# Patient Record
Sex: FEMALE | Race: WHITE | NOT HISPANIC OR LATINO | Employment: FULL TIME | URBAN - METROPOLITAN AREA
[De-identification: names, ages, dates, MRNs, and addresses within clinical notes are randomized per-mention and may not be internally consistent; named-entity substitution may affect disease eponyms.]

---

## 2017-04-11 DIAGNOSIS — G47.33 OSA (OBSTRUCTIVE SLEEP APNEA): Primary | ICD-10-CM

## 2017-08-01 ENCOUNTER — TELEPHONE (OUTPATIENT)
Dept: SLEEP MEDICINE | Facility: CLINIC | Age: 52
End: 2017-08-01

## 2017-08-01 DIAGNOSIS — G25.81 RESTLESS LEGS SYNDROME (RLS): ICD-10-CM

## 2017-08-01 NOTE — TELEPHONE ENCOUNTER
Patient called because she would like a refill of her pramipexole and hasn't been seen this year. Please give her a call at [phone].    Pratibha Rm

## 2017-08-07 ENCOUNTER — TELEPHONE (OUTPATIENT)
Dept: SLEEP MEDICINE | Facility: CLINIC | Age: 52
End: 2017-08-07

## 2017-08-07 NOTE — TELEPHONE ENCOUNTER
Patient called last week to get a refill of her Paroxetine 20mg and hadn't heard back from anyone. She is completely out of her medication. She is requesting us the refill her prescription with [facility] and then just send a five day refill over to [facility] on [address] in [place]. She would like someone to call her back to confirm they have sent the prescription over to [facility] and [facility]. You may leave her a voice mail.

## 2017-08-08 RX ORDER — PRAMIPEXOLE DIHYDROCHLORIDE 0.5 MG/1
0.5 TABLET ORAL AT BEDTIME
Qty: 90 TABLET | Refills: 3 | Status: SHIPPED | OUTPATIENT
Start: 2017-08-08 | End: 2018-08-03

## 2017-08-21 NOTE — TELEPHONE ENCOUNTER
Medication was filled on 08/08/2017  Left msg with pt to call if she has any further questions.    Rosa Hanks  [facility] - Specialist

## 2017-09-10 ENCOUNTER — HEALTH MAINTENANCE LETTER (OUTPATIENT)
Age: 52
End: 2017-09-10

## 2018-08-03 DIAGNOSIS — G25.81 RESTLESS LEGS SYNDROME (RLS): ICD-10-CM

## 2018-08-06 RX ORDER — PRAMIPEXOLE DIHYDROCHLORIDE 0.5 MG/1
0.5 TABLET ORAL AT BEDTIME
Qty: 90 TABLET | Refills: 3 | Status: SHIPPED | OUTPATIENT
Start: 2018-08-06

## 2019-08-30 ENCOUNTER — TELEPHONE (OUTPATIENT)
Dept: SLEEP MEDICINE | Facility: CLINIC | Age: 54
End: 2019-08-30

## 2019-08-30 NOTE — TELEPHONE ENCOUNTER
[facility] contacted our office regarding this patient. She is requesting a refill of Pramipexole. Patient has not been seen in clinic since 2016 and last refill was 2018. Refill request for this medication is denied until patient follows up in clinic with one of our providers or her primary care provider.    Rosa VillagomezHanks  [facility] - Specialist

## 2019-11-08 ENCOUNTER — HEALTH MAINTENANCE LETTER (OUTPATIENT)
Age: 54
End: 2019-11-08

## 2020-02-23 ENCOUNTER — HEALTH MAINTENANCE LETTER (OUTPATIENT)
Age: 55
End: 2020-02-23

## 2020-12-06 ENCOUNTER — HEALTH MAINTENANCE LETTER (OUTPATIENT)
Age: 55
End: 2020-12-06

## 2021-02-05 ENCOUNTER — APPOINTMENT (OUTPATIENT)
Dept: CT IMAGING | Facility: CLINIC | Age: 56
End: 2021-02-05
Attending: EMERGENCY MEDICINE
Payer: COMMERCIAL

## 2021-02-05 ENCOUNTER — HOSPITAL ENCOUNTER (EMERGENCY)
Facility: CLINIC | Age: 56
Discharge: HOME | End: 2021-02-05
Attending: EMERGENCY MEDICINE | Admitting: EMERGENCY MEDICINE
Payer: COMMERCIAL

## 2021-02-05 VITALS
DIASTOLIC BLOOD PRESSURE: 86 MMHG | TEMPERATURE: 98 F | HEART RATE: 62 BPM | BODY MASS INDEX: 29.37 KG/M2 | RESPIRATION RATE: 16 BRPM | OXYGEN SATURATION: 98 % | HEIGHT: 64 IN | SYSTOLIC BLOOD PRESSURE: 135 MMHG | WEIGHT: 172 LBS

## 2021-02-05 DIAGNOSIS — G44.209 TENSION HEADACHE: ICD-10-CM

## 2021-02-05 LAB
ANION GAP SERPL CALCULATED.3IONS-SCNC: 6 MMOL/L (ref 3–14)
BASOPHILS # BLD AUTO: 0 10E9/L (ref 0–0.2)
BASOPHILS NFR BLD AUTO: 0.6 %
BUN SERPL-MCNC: 15 MG/DL (ref 7–30)
CALCIUM SERPL-MCNC: 9.4 MG/DL (ref 8.5–10.1)
CHLORIDE SERPL-SCNC: 112 MMOL/L (ref 94–109)
CO2 SERPL-SCNC: 24 MMOL/L (ref 20–32)
CREAT SERPL-MCNC: 0.97 MG/DL (ref 0.52–1.04)
DIFFERENTIAL METHOD BLD: NORMAL
EOSINOPHIL # BLD AUTO: 0 10E9/L (ref 0–0.7)
EOSINOPHIL NFR BLD AUTO: 0.8 %
ERYTHROCYTE [DISTWIDTH] IN BLOOD BY AUTOMATED COUNT: 12.1 % (ref 10–15)
GFR SERPL CREATININE-BSD FRML MDRD: 65 ML/MIN/{1.73_M2}
GLUCOSE SERPL-MCNC: 96 MG/DL (ref 70–99)
HCT VFR BLD AUTO: 38.1 % (ref 35–47)
HGB BLD-MCNC: 13.3 G/DL (ref 11.7–15.7)
IMM GRANULOCYTES # BLD: 0 10E9/L (ref 0–0.4)
IMM GRANULOCYTES NFR BLD: 0.4 %
INTERPRETATION ECG - MUSE: NORMAL
LYMPHOCYTES # BLD AUTO: 2 10E9/L (ref 0.8–5.3)
LYMPHOCYTES NFR BLD AUTO: 39 %
MCH RBC QN AUTO: 30.9 PG (ref 26.5–33)
MCHC RBC AUTO-ENTMCNC: 34.9 G/DL (ref 31.5–36.5)
MCV RBC AUTO: 88 FL (ref 78–100)
MONOCYTES # BLD AUTO: 0.3 10E9/L (ref 0–1.3)
MONOCYTES NFR BLD AUTO: 6.6 %
NEUTROPHILS # BLD AUTO: 2.6 10E9/L (ref 1.6–8.3)
NEUTROPHILS NFR BLD AUTO: 52.6 %
NRBC # BLD AUTO: 0 10*3/UL
NRBC BLD AUTO-RTO: 0 /100
PLATELET # BLD AUTO: 221 10E9/L (ref 150–450)
POTASSIUM SERPL-SCNC: 3.9 MMOL/L (ref 3.4–5.3)
RBC # BLD AUTO: 4.31 10E12/L (ref 3.8–5.2)
SODIUM SERPL-SCNC: 142 MMOL/L (ref 133–144)
TROPONIN I SERPL-MCNC: <0.015 UG/L (ref 0–0.04)
WBC # BLD AUTO: 5 10E9/L (ref 4–11)

## 2021-02-05 PROCEDURE — 80048 BASIC METABOLIC PNL TOTAL CA: CPT | Performed by: EMERGENCY MEDICINE

## 2021-02-05 PROCEDURE — 85025 COMPLETE CBC W/AUTO DIFF WBC: CPT | Performed by: EMERGENCY MEDICINE

## 2021-02-05 PROCEDURE — 70450 CT HEAD/BRAIN W/O DYE: CPT

## 2021-02-05 PROCEDURE — 84484 ASSAY OF TROPONIN QUANT: CPT | Performed by: EMERGENCY MEDICINE

## 2021-02-05 PROCEDURE — 99285 EMERGENCY DEPT VISIT HI MDM: CPT | Mod: 25

## 2021-02-05 PROCEDURE — 93005 ELECTROCARDIOGRAM TRACING: CPT

## 2021-02-05 ASSESSMENT — ENCOUNTER SYMPTOMS
PHOTOPHOBIA: 0
WEAKNESS: 0
DIZZINESS: 1
HEADACHES: 1
LIGHT-HEADEDNESS: 1
NUMBNESS: 0

## 2021-02-05 ASSESSMENT — MIFFLIN-ST. JEOR: SCORE: 1365.19

## 2021-02-05 NOTE — ED TRIAGE NOTES
Pt states that she has been having headaches for the past week. Hx of HTN, had high BP reading at home.

## 2021-02-05 NOTE — ED PROVIDER NOTES
History   Chief Complaint:  Headache    HPI   Nicolle Naidu is a 55 year old female with history of breast cancer, hypertension, and type 2 diabetes mellitus who presents with a headache. The patient states she has had a non radiating headache at the base of her neck for the past week. Last night she felt lightheaded and dizzy so she took her blood pressure last night which was 170/145 and 160/145. This morning she took her blood pressure again and it was 168/130. She notes she has a history of high blood pressure but stopped taking her medication in 2019. She also notes she has had increased stress due to her  being in the ICU for his 4th stroke, as well as having to take care of her mother. Of note, the patient states she lost a total of 90 pounds, but has gained 6 pounds since the start of COVID-19. She denies photophobia, weakness, or numbness.    Review of Systems   Eyes: Negative for photophobia.   Neurological: Positive for dizziness, light-headedness and headaches. Negative for weakness and numbness.   All other systems reviewed and are negative.    Allergies:  The patient has no known allergies.     Medications:  Atenolol  Atorvastatin   Flexeril  Flonase  Microzide  Norco  Metformin  Naproxen  Omeprazole  Paroxetine  Mirapex  Ambien    Past Medical History:    Degenerative disorder of bone  Obstructive sleep apnea  Breast cancer  Obesity  Type 2 diabetes mellitus  Hypertension  Hypercholesterolemia  GERD   Adenoid hypertrophy  Pneumonia    Past Surgical History:    Tonsillectomy  Tubal ligation  Mastectomy  Adenoidectomy  Laparoscopic gastrectomy sleeve  Breast reconstruction     Family History:    Mother - Uterine Cancer    Social History:  Stopped smoking in 2018.  Occasional alcohol use, a couple times a week. No history of alcohol withdrawals.     Physical Exam     Patient Vitals for the past 24 hrs:   BP Temp Temp src Pulse Resp SpO2 Height Weight   02/05/21 1117 127/87 98  F (36.7  C)  "Temporal 81 16 98 % 1.626 m (5' 4\") 78 kg (172 lb)       Physical Exam  GENERAL: well developed, pleasant  HEAD: atraumatic  EYES: pupils reactive, extraocular muscles intact, conjunctivae normal  ENT:  mucus membranes moist  NECK:  trachea midline, normal range of motion  RESPIRATORY: no tachypnea, breath sounds clear to auscultation   CVS: normal S1/S2, no murmurs, intact distal pulses  ABDOMEN: soft, nontender, nondistention  MUSCULOSKELETAL: no deformities  SKIN: warm and dry, no acute rashes or ulceration  NEURO: GCS 15, cranial nerves intact, alert and oriented x3  PSYCH:  Mood/affect normal    Emergency Department Course   ECG  ECG taken at 1144, ECG read at 1150  Normal sinus rhythm. Normal ECG.   Rate 62 bpm. NY interval 172 ms. QRS duration 88 ms. QT/QTc 450/456 ms. P-R-T axes 30 -23 21.     Imaging:  CT Head without Contrast  No intracranial hemorrhage, mass, or definite CT evidence of recent  ischemia.  Reading per radiology.    Laboratory:  CBC: AWNL (WBC 5.0, HGB 13.3, )  BMP: Chloride 112 (H), o/w WNL (Creatinine 0.97)  Troponin (Collected 1138): <0.015     Emergency Department Course:    Reviewed:  I reviewed vitals and past medical history    Assessments:  1125 I obtained history and examined the patient as noted above.   1235 I rechecked the patient and explained findings.     Disposition:  The patient was discharged to home.     Impression & Plan     Medical Decision Making:  Patient presents with new onset headache with significant amount of stress with her  being in the ICU as well as caring for a mother but noted increased blood pressure last night and today.  She has no other symptoms such as fevers chills neck pain or findings on exam terms of stroke or dissection.  Labs and imaging are normal.  Blood pressure here is normal.  Discussed with her likely stress and tension from all that issues that are been going on in her family and to continue to monitor her blood pressure but " likely not a cause and effect in terms of blood pressure and her headaches.    Diagnosis:    ICD-10-CM    1. Tension headache  G44.209      Scribe Disclosure:  I, Liz Mattson, am serving as a scribe at 11:18 AM on 2/5/2021 to document services personally performed by Price Fortune MD based on my observations and the provider's statements to me.      Price Fortune MD  02/05/21 1201

## 2021-07-31 ENCOUNTER — HEALTH MAINTENANCE LETTER (OUTPATIENT)
Age: 56
End: 2021-07-31

## 2021-09-25 ENCOUNTER — HEALTH MAINTENANCE LETTER (OUTPATIENT)
Age: 56
End: 2021-09-25

## 2022-01-15 ENCOUNTER — HEALTH MAINTENANCE LETTER (OUTPATIENT)
Age: 57
End: 2022-01-15

## 2022-01-17 ENCOUNTER — APPOINTMENT (OUTPATIENT)
Dept: CT IMAGING | Facility: CLINIC | Age: 57
End: 2022-01-17
Attending: NURSE PRACTITIONER
Payer: COMMERCIAL

## 2022-01-17 ENCOUNTER — HOSPITAL ENCOUNTER (EMERGENCY)
Facility: CLINIC | Age: 57
Discharge: HOME | End: 2022-01-17
Attending: NURSE PRACTITIONER | Admitting: NURSE PRACTITIONER
Payer: COMMERCIAL

## 2022-01-17 VITALS
TEMPERATURE: 97.8 F | DIASTOLIC BLOOD PRESSURE: 96 MMHG | SYSTOLIC BLOOD PRESSURE: 132 MMHG | HEART RATE: 77 BPM | OXYGEN SATURATION: 99 % | RESPIRATION RATE: 18 BRPM | HEIGHT: 64 IN | BODY MASS INDEX: 29.52 KG/M2

## 2022-01-17 DIAGNOSIS — S16.1XXA STRAIN OF NECK MUSCLE, INITIAL ENCOUNTER: ICD-10-CM
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
## 2022-01-17 PROCEDURE — 72125 CT NECK SPINE W/O DYE: CPT

## 2022-01-17 PROCEDURE — 250N000013 HC RX MED GY IP 250 OP 250 PS 637: Performed by: NURSE PRACTITIONER

## 2022-01-17 PROCEDURE — 99284 EMERGENCY DEPT VISIT MOD MDM: CPT | Mod: 25

## 2022-01-17 RX ORDER — CYCLOBENZAPRINE HCL 10 MG
10 TABLET ORAL 3 TIMES DAILY PRN
Qty: 20 TABLET | Refills: 0 | Status: SHIPPED | OUTPATIENT
Start: 2022-01-17 | End: 2022-01-23

## 2022-01-17 RX ORDER — ACETAMINOPHEN 500 MG
1000 TABLET ORAL ONCE
Status: COMPLETED | OUTPATIENT
Start: 2022-01-17 | End: 2022-01-17

## 2022-01-17 RX ORDER — PREDNISONE 20 MG/1
TABLET ORAL
Qty: 3 TABLET | Refills: 0 | Status: SHIPPED | OUTPATIENT
Start: 2022-01-17

## 2022-01-17 RX ADMIN — ACETAMINOPHEN 1000 MG: 500 TABLET, FILM COATED ORAL at 10:34

## 2022-01-17 ASSESSMENT — ENCOUNTER SYMPTOMS
COUGH: 0
MYALGIAS: 1
NECK PAIN: 1
HEADACHES: 0
BACK PAIN: 1
ARTHRALGIAS: 1

## 2022-01-17 NOTE — ED PROVIDER NOTES
"  History   Chief Complaint:  Fall     HPI   Nicolle Naidu is a 56 year old female with history of hypertension, hyperlipidemia, type II diabetes, and right rotator cuff repair, who presents for evaluation following a fall. Yesterday the patient was walking outside when she slipped on ice and fell landing on her left shoulder. She did not strike her head or lose consciousness in the fall. Approximately 5-6 hours after the fall, she has developed pain in the left shoulder, her left upper back, and in the left side of her neck. This pain is worse with movement and radiates down her left arm. Overnight her pain worsened and she had difficulty sleeping due to the severity of her pain. Due to her ongoing pain this morning she came into the ED for evaluation.     Review of Systems   Respiratory: Negative for cough.    Cardiovascular: Negative for chest pain.   Musculoskeletal: Positive for arthralgias (left shoulder), back pain, myalgias (left arm) and neck pain.   Neurological: Negative for syncope and headaches.   All other systems reviewed and are negative.    Allergies:  No known drug allergies     Medications:  Atenolol  Atorvastatin calcium  Flexeril  Microzide  Norco  Metformin  Omeprazole  Paroxetine  Mirapex  Ambien     Past Medical History:     Diabetes mellitus, type II   Hypertension  Hyperlipidemia  GERD   Obstructive sleep apnea   Breast cancer       Past Surgical History:    Breast surgery  Mastectomy  Laparoscopic gastric sleeve  Tonsillectomy     Social History:  The patient presents to the ED alone.     Physical Exam     Patient Vitals for the past 24 hrs:   BP Temp Temp src Pulse Resp SpO2 Height   01/17/22 0750 (!) 140/86 97.8  F (36.6  C) Temporal 91 18 99 % 1.626 m (5' 4\")       Physical Exam  General: Alert. Well kept.  HEENT:   Head: No facial asymmetry. No palpable scalp hematomas or bony step offs.  Eyes: Normal conjunctiva. No scleral icterus. PERRLA. EOMI. No raccoon s eyes. .   Nose: No " deformity. No nasal drainage.   Throat: Moist mucous membranes. No evidence for intraoral trauma.   Neck: left sided C4-5 tenderness  Cardiac: Normal rate and regular rhythm.  No murmurs, rubs, or gallops appreciated.   Pulmonary: CTA bilaterally. No wheezing, crackles, or rhonchi appreciated.   Abdomen: Soft, non-tender, non-distended. No rebound or guarding.   Neuro: GCS 15. Alert and oriented. Cranial nerves II-XII intact. 5/5 strength equal bilateral upper and lower extremities. Gait smooth.  Visual fields bilateral without deficit.  MUSCULOSKELETAL: Normal gross range of motion of all 4 extremities. No midline tenderness over thoracic, lumbar or sacral spine.   Upper extremities: 5/5 symmetric strength and ROM with shoulder abduction and adduction, elbow flexion and extension, dorsi- and palmar-flexion, , compartments soft.   SKIN: Skin is warm and dry. No rashes, petechiae or pallor. Normal appearance of visualized exposed skin.   PSYCH: Normal affect and mood. Good eye contact.    Emergency Department Course     Imaging:  Cervical Spine CT w/o Contrast:  IMPRESSION:   1. No acute fracture or posttraumatic malalignment of the cervical spine.   2. Multilevel degenerative changes, as described.   Per radiology.      Emergency Department Course:  Reviewed:  I reviewed nursing notes, vitals and past medical history    Assessments:  1022: I obtained history and examined the patient as noted above.     1140: I updated and reassessed the patient.     Interventions:  1034 Tylenol 1,000 mg PO    Disposition:  The patient was discharged to home.     Impression & Plan   Medical Decision Making:  Nicolle Naidu is a 56 year old female with history of hypertension, hyperlipidemia, type II diabetes, and right rotator cuff repair, who presents for evaluation following a fall last evening with a left medial scapular and left-sided neck pain.  The patient is concerned for her radiating left arm pain.  She has taken no  medication for symptoms today.  On exam she has mild tenderness along the left cervical spine at C4/5 with normal full strength and sensation in the left upper extremity.  She has normal range of motion at the shoulder, elbow, wrist.  CT of the cervical spine shows degenerative changes with minimal disc height loss at C5/6 and a small multilevel disc bulges/protrusions with mild multilevel spinal canal stenosis and no high-grade spinal canal narrowing.  She did not strike her head and has a normal neurologic exam without headache and no indication for head CT using [place] head CT guidelines.  No other injuries noted on examination.  No indication for MRI or neurosurgical evaluation from the ED.  She will be started on prednisone and Flexeril for her injury and will follow-up with her primary care and/or her [facility] orthopedist in the next 2 to 3 days.  She will return for any headache, weakness of the left upper extremity, new injury.       Diagnosis:    ICD-10-CM    1. Fall, initial encounter  W19.XXXA    2. Strain of neck muscle, initial encounter  S16.1XXA       Discharge Medications:  New Prescriptions    CYCLOBENZAPRINE (FLEXERIL) 10 MG TABLET    Take 1 tablet (10 mg) by mouth 3 times daily as needed for muscle spasms    PREDNISONE (DELTASONE) 20 MG TABLET    Take one tablets (= 20mg) each day for 3 (three) days        Scribe Disclosure:  I, Cirilo Rojas, am serving as a scribe at 10:22 AM on 1/17/2022 to document services personally performed by Shavon Murillo DNP, based on my observations and the provider's statements to me.        Shavon Murillo, CNP  01/17/22 1096

## 2022-01-17 NOTE — ED TRIAGE NOTES
Pt reports slipping on  the ice yestersay and falling on her left shoulder and arm.  Complains of worsening arm, shoulder, and neck pain today that is worse with movement.

## 2022-02-12 ENCOUNTER — HOSPITAL ENCOUNTER (EMERGENCY)
Facility: CLINIC | Age: 57
Discharge: HOME | End: 2022-02-12
Attending: EMERGENCY MEDICINE | Admitting: EMERGENCY MEDICINE
Payer: COMMERCIAL

## 2022-02-12 ENCOUNTER — APPOINTMENT (OUTPATIENT)
Dept: CT IMAGING | Facility: CLINIC | Age: 57
End: 2022-02-12
Attending: EMERGENCY MEDICINE
Payer: COMMERCIAL

## 2022-02-12 VITALS
RESPIRATION RATE: 16 BRPM | BODY MASS INDEX: 29.02 KG/M2 | TEMPERATURE: 98.1 F | OXYGEN SATURATION: 97 % | HEART RATE: 69 BPM | WEIGHT: 170 LBS | SYSTOLIC BLOOD PRESSURE: 147 MMHG | DIASTOLIC BLOOD PRESSURE: 98 MMHG | HEIGHT: 64 IN

## 2022-02-12 DIAGNOSIS — R06.00 DYSPNEA, UNSPECIFIED TYPE: ICD-10-CM

## 2022-02-12 DIAGNOSIS — U07.1 INFECTION DUE TO 2019 NOVEL CORONAVIRUS: ICD-10-CM

## 2022-02-12 DIAGNOSIS — I77.810 AORTIC ECTASIA, THORACIC (H): ICD-10-CM

## 2022-02-12 LAB
ATRIAL RATE - MUSE: 76 BPM
DIASTOLIC BLOOD PRESSURE - MUSE: NORMAL MMHG
INTERPRETATION ECG - MUSE: NORMAL
P AXIS - MUSE: 31 DEGREES
PR INTERVAL - MUSE: 162 MS
QRS DURATION - MUSE: 86 MS
QT - MUSE: 374 MS
QTC - MUSE: 420 MS
R AXIS - MUSE: -23 DEGREES
SYSTOLIC BLOOD PRESSURE - MUSE: NORMAL MMHG
T AXIS - MUSE: 36 DEGREES
TROPONIN I SERPL HS-MCNC: <3 NG/L
VENTRICULAR RATE- MUSE: 76 BPM

## 2022-02-12 PROCEDURE — 93005 ELECTROCARDIOGRAM TRACING: CPT

## 2022-02-12 PROCEDURE — 84484 ASSAY OF TROPONIN QUANT: CPT | Performed by: EMERGENCY MEDICINE

## 2022-02-12 PROCEDURE — 250N000011 HC RX IP 250 OP 636: Performed by: EMERGENCY MEDICINE

## 2022-02-12 PROCEDURE — 71275 CT ANGIOGRAPHY CHEST: CPT

## 2022-02-12 PROCEDURE — 99285 EMERGENCY DEPT VISIT HI MDM: CPT | Mod: 25

## 2022-02-12 PROCEDURE — 36415 COLL VENOUS BLD VENIPUNCTURE: CPT | Performed by: EMERGENCY MEDICINE

## 2022-02-12 PROCEDURE — 250N000009 HC RX 250: Performed by: EMERGENCY MEDICINE

## 2022-02-12 RX ORDER — IOPAMIDOL 755 MG/ML
65 INJECTION, SOLUTION INTRAVASCULAR ONCE
Status: COMPLETED | OUTPATIENT
Start: 2022-02-12 | End: 2022-02-12

## 2022-02-12 RX ADMIN — IOPAMIDOL 65 ML: 755 INJECTION, SOLUTION INTRAVENOUS at 17:32

## 2022-02-12 RX ADMIN — SODIUM CHLORIDE 90 ML: 9 INJECTION, SOLUTION INTRAVENOUS at 17:32

## 2022-02-12 ASSESSMENT — ENCOUNTER SYMPTOMS
DIZZINESS: 1
CONFUSION: 1
COUGH: 1
SHORTNESS OF BREATH: 0
FATIGUE: 1

## 2022-02-12 ASSESSMENT — MIFFLIN-ST. JEOR: SCORE: 1351.11

## 2022-02-12 NOTE — ED PROVIDER NOTES
History   Chief Complaint:  Chest Pain     The history is provided by the patient.      Nicolle Naidu is a 56 year old female with history of hypertension, hyperlipidemia and type 2 diabetes who presents with chest pain. Patient reports that she was diagnosed with Covid on 01/28/22 and has been experiencing residual symptoms for about a week. States that her most prominent symptoms is intermittent chest pressure when she lies down. Endorses she also feels fatigue when going up the stairs, which is out of her baseline. Denies shortness of breath or new ankle swelling. Other symptoms include dizziness, cough and confusion. Notes she was seen yesterday for her concerns and had blood work done (see results below), which showed an elevated D-dimer. Her PCP advised her to come into the ED for a chest CT. Vaccinated x2 to Covid with Pfizer. Nonsmoker, no recent travel or sick contacts.     Laboratory 02/11/22  Ddimer: 1.21 (H)  CBC: WBC 5.2, HGB 12.3,   CMP: Glucose 86, o/w WNL (Creatinine 0.92)    Review of Systems   Constitutional: Positive for fatigue.   Respiratory: Positive for cough. Negative for shortness of breath.    Cardiovascular: Positive for chest pain. Negative for leg swelling.   Neurological: Positive for dizziness.   Psychiatric/Behavioral: Positive for confusion.     Allergies:  Bee Venom    Medications:  Albuterol inhaler  Aspirin 81 mg   Tessalon  Lasix  Robitussin AC  Topamax  Trazodone  Flexeril  Prilosec  Paxil  Prednisone    Past Medical History:     Breast cancer  Obese  Type 2 diabetes  Hyperlipidemia  Hypertension  GERD  Tobacco use    Past Surgical History:    Gastric sleeve  Mastectomy  Tonsillectomy  Tubal ligation  Hysterectomy    Family History:    Father - hypertension, type 2 diabetes  Mother - COPD, hypertension, endometrial cancer    Social History:  Patient presents alone  Presents via private vehicle  Nonsmoker    Physical Exam     Patient Vitals for the past 24 hrs:   BP  "Temp Temp src Pulse Resp SpO2 Height Weight   02/12/22 1936 (!) 147/98 98.1  F (36.7  C) Oral 69 16 97 % -- --   02/12/22 1934 (!) 147/98 -- -- 69 -- -- -- --   02/12/22 1731 -- -- -- 66 17 97 % -- --   02/12/22 1632 125/85 98.1  F (36.7  C) Oral 82 -- 95 % -- --   02/12/22 1630 -- -- -- -- 16 -- 1.626 m (5' 4\") 77.1 kg (170 lb)       Physical Exam    Physical Exam   Constitutional:  Patient is oriented to person, place, and time. They appear well-developed and well-nourished.    HENT:   Mouth/Throat:   Oropharynx is clear and moist.   Eyes:    Conjunctivae normal and EOM are normal. Pupils are equal, round, and reactive to light.   Neck:    Normal range of motion.   Cardiovascular: Normal rate, regular rhythm and normal heart sounds.  Exam reveals no gallop and no friction rub.  No murmur heard.  Pulmonary/Chest:  Effort normal and breath sounds normal. Patient has no rales. Trace expiratory wheezes.   Abdominal:   Soft. Bowel sounds are normal. Patient exhibits no mass. There is no tenderness. There is no rebound and no guarding.   Musculoskeletal:  Normal range of motion. Patient exhibits no edema.   Neurological:   Patient is alert and oriented to person, place, and time. Patient has normal strength. No cranial nerve deficit or sensory deficit. GCS 15.  Skin:   Skin is warm and dry. No rash noted. No erythema.   Psychiatric:   Patient has a normal mood and affect. Patient's behavior is normal. Judgment and thought content normal.     Emergency Department Course   ECG  ECG obtained at 1641, ECG read at 1650  NSR  Minimal voltage criteria for LVH, may be normal variant   Rate 76 bpm. ID interval 162 ms. QRS duration 86 ms. QT/QTc 374/420 ms. P-R-T axes 31 -23 36.     Imaging:  CT Chest Pulmonary Embolism w Contrast   Final Result   IMPRESSION:   1.  No evidence for pulmonary embolism.   2.  Ectasia of the ascending thoracic aorta measures 3.8 cm.        Report per radiology.    Laboratory:  Labs Ordered and " Resulted from Time of ED Arrival to Time of ED Departure   TROPONIN I - Normal       Result Value    Troponin I High Sensitivity <3         Procedures    Emergency Department Course:         Reviewed:  I reviewed nursing notes, vitals, past medical history, Care Everywhere and [facility]    Assessments/Consults:  Past medical records, nursing notes, and vitals reviewed.  1653: I performed an exam of the patient and obtained history, as documented above.   1850: Rechecked and updated the patient.    Interventions:    Disposition:  The patient was discharged to home.     Impression & Plan   Medical Decision Making:  Nicolle Naidu is a 56-year-old female presenting to the emergency department at the recommendation of her primary care doctor after they did a D-dimer yesterday office and it came back elevated today.  Again she went to go see her doctor as she was having lingering symptoms since her diagnosis of COVID.  I did do an EKG here this is a sinus rhythm without any acute abnormalities.  I do see the blood work from yesterday that shows no evidence of leukocytosis or acute anemia.  Metabolic panel is also normal.  I did do a troponin here today and it was within normal limits.  I did do a CTA of her chest to evaluate for PE.  There is no PE or mass, free air, pleural effusion, pericardial effusion, dissection.  She does have aortic ectasia which is an incidental finding and I did discuss with her.    At this time I suspect her symptoms are due to lingering Covid.  I feel she is safe for discharge there is no acute medical reason why she requires hospitalization.  She will take her inhaler as prescribed by her primary care doctor yesterday and follow-up closely with primary.    Diagnosis:    ICD-10-CM    1. Dyspnea, unspecified type  R06.00    2. Infection due to 2019 novel coronavirus  U07.1    3. Aortic ectasia, thoracic (H)  I77.810        Discharge Medications:  Discharge Medication List as of 2/12/2022  7:38 PM           Scribe Disclosure:  I, Kusum Lynn, am serving as a scribe at 4:34 PM on 2/12/2022 to document services personally performed by Mellissa Lopez MD based on my observations and the provider's statements to me.             Mellissa Lopez MD  02/12/22 2051

## 2022-02-12 NOTE — ED TRIAGE NOTES
Patient was sent here for CT due to elevated D Dimer. She was Dx 1/28 with covid. She is short of breath with a cough and feeling fatigued and having chest pain

## 2022-02-13 ENCOUNTER — MEDICAL CORRESPONDENCE (OUTPATIENT)
Dept: HEALTH INFORMATION MANAGEMENT | Facility: CLINIC | Age: 57
End: 2022-02-13
Payer: COMMERCIAL

## 2022-03-12 ENCOUNTER — HEALTH MAINTENANCE LETTER (OUTPATIENT)
Age: 57
End: 2022-03-12

## 2022-07-02 ENCOUNTER — OFFICE VISIT (OUTPATIENT)
Dept: URGENT CARE | Facility: URGENT CARE | Age: 57
End: 2022-07-02
Payer: COMMERCIAL

## 2022-07-02 ENCOUNTER — ANCILLARY PROCEDURE (OUTPATIENT)
Dept: GENERAL RADIOLOGY | Facility: CLINIC | Age: 57
End: 2022-07-02
Attending: PHYSICIAN ASSISTANT
Payer: COMMERCIAL

## 2022-07-02 VITALS
DIASTOLIC BLOOD PRESSURE: 99 MMHG | OXYGEN SATURATION: 99 % | HEART RATE: 74 BPM | TEMPERATURE: 97.4 F | SYSTOLIC BLOOD PRESSURE: 146 MMHG

## 2022-07-02 DIAGNOSIS — S69.92XA FINGER INJURY, LEFT, INITIAL ENCOUNTER: ICD-10-CM

## 2022-07-02 DIAGNOSIS — S62.667A CLOSED NONDISPLACED FRACTURE OF DISTAL PHALANX OF LEFT LITTLE FINGER, INITIAL ENCOUNTER: Primary | ICD-10-CM

## 2022-07-02 PROCEDURE — 73140 X-RAY EXAM OF FINGER(S): CPT | Mod: TC | Performed by: RADIOLOGY

## 2022-07-02 PROCEDURE — 99203 OFFICE O/P NEW LOW 30 MIN: CPT | Performed by: PHYSICIAN ASSISTANT

## 2022-07-02 RX ORDER — FUROSEMIDE 20 MG
TABLET ORAL
COMMUNITY
Start: 2022-04-03

## 2022-07-02 RX ORDER — TRAZODONE HYDROCHLORIDE 50 MG/1
100 TABLET, FILM COATED ORAL AT BEDTIME
COMMUNITY
Start: 2022-06-18 | End: 2022-11-05 | Stop reason: ALTCHOICE

## 2022-07-02 RX ORDER — IBUPROFEN 200 MG
950 CAPSULE ORAL
COMMUNITY

## 2022-07-02 NOTE — PATIENT INSTRUCTIONS
(S07.044H) Closed nondisplaced fracture of distal phalanx of left little finger, initial encounter  (primary encounter diagnosis)  Comment:   Plan: acetaminophen-codeine (TYLENOL #3) 300-30 MG         Tablet  Alumafoam splint placed.              (E93.71EJ) Finger injury, left, initial encounter  Comment:   Plan: XR Finger Left G/E 2 Views          Follow up with Orthopedics as soon as possible.

## 2022-07-02 NOTE — PROGRESS NOTES
Patient presents with:  Urgent Care: Fell x 1 week left hand pinky    (U33.360B) Closed nondisplaced fracture of distal phalanx of left little finger, initial encounter  (primary encounter diagnosis)  Comment:   Plan: acetaminophen-codeine (TYLENOL #3) 300-30 MG         Tablet  Alumafoam splint placed.              (S69.92XA) Finger injury, left, initial encounter  Comment:   Plan: XR Finger Left G/E 2 Views          Follow up with Orthopedics as soon as possible.        SUBJECTIVE:   Nicolle Naidu is a 57 year old female who presents today with left pinky pain and swelling, onset one week ago after fall.            Past Medical History:   Diagnosis Date     Degenerative disorder of bone 2010    back; chronic         Current Outpatient Medications   Medication Sig Dispense Refill     Multiple Vitamins-Iron (DAILY-ANA/IRON/BETA-CAROTENE) TABS TAKE 1 TABLET BY MOUTH DAILY. (Patient not taking: Reported on 10/19/2020) 30 tablet 7     Social History     Tobacco Use     Smoking status: Never Smoker     Smokeless tobacco: Never Used   Substance Use Topics     Alcohol use: Not on file     Family History   Problem Relation Age of Onset     Diabetes Mother      Diabetes Father          ROS:    10 point ROS of systems including Constitutional, Eyes, Respiratory, Cardiovascular, Gastroenterology, Genitourinary, Integumentary, Muscularskeletal, Psychiatric ,neurological were all negative except for pertinent positives noted in my HPI       OBJECTIVE:  BP (!) 146/99 (BP Location: Right arm, Patient Position: Sitting, Cuff Size: Adult Regular)   Pulse 74   Temp 97.4  F (36.3  C) (Tympanic)   SpO2 99%   Breastfeeding No   Physical Exam:  GENERAL APPEARANCE: healthy, alert and no distress  Extremities: left 5th finger with tenderness and swelling  NEURO: Normal strength and tone, sensory exam grossly normal,  normal speech and mentation  SKIN: no suspicious lesions or rashes    X-Ray was done, my findings are: avulsion  fracture. displaced

## 2022-11-03 ENCOUNTER — APPOINTMENT (OUTPATIENT)
Dept: GENERAL RADIOLOGY | Facility: CLINIC | Age: 57
End: 2022-11-03
Attending: EMERGENCY MEDICINE
Payer: COMMERCIAL

## 2022-11-03 ENCOUNTER — HOSPITAL ENCOUNTER (OUTPATIENT)
Facility: CLINIC | Age: 57
Setting detail: OBSERVATION
Discharge: HOME | End: 2022-11-05
Attending: EMERGENCY MEDICINE | Admitting: EMERGENCY MEDICINE
Payer: COMMERCIAL

## 2022-11-03 DIAGNOSIS — R45.851 SUICIDAL IDEATION: ICD-10-CM

## 2022-11-03 DIAGNOSIS — F32.A DEPRESSION, UNSPECIFIED DEPRESSION TYPE: ICD-10-CM

## 2022-11-03 DIAGNOSIS — F10.20 ALCOHOL USE DISORDER, SEVERE, DEPENDENCE (H): ICD-10-CM

## 2022-11-03 LAB
ALBUMIN SERPL-MCNC: 3.9 G/DL (ref 3.4–5)
ALP SERPL-CCNC: 81 U/L (ref 40–150)
ALT SERPL W P-5'-P-CCNC: 36 U/L (ref 0–50)
ANION GAP SERPL CALCULATED.3IONS-SCNC: 7 MMOL/L (ref 3–14)
AST SERPL W P-5'-P-CCNC: 37 U/L (ref 0–45)
BASOPHILS # BLD AUTO: 0 10E3/UL (ref 0–0.2)
BASOPHILS NFR BLD AUTO: 0 %
BILIRUB SERPL-MCNC: 1.2 MG/DL (ref 0.2–1.3)
BUN SERPL-MCNC: 11 MG/DL (ref 7–30)
CALCIUM SERPL-MCNC: 9.9 MG/DL (ref 8.5–10.1)
CHLORIDE BLD-SCNC: 103 MMOL/L (ref 94–109)
CO2 SERPL-SCNC: 26 MMOL/L (ref 20–32)
CREAT SERPL-MCNC: 0.77 MG/DL (ref 0.52–1.04)
EOSINOPHIL # BLD AUTO: 0 10E3/UL (ref 0–0.7)
EOSINOPHIL NFR BLD AUTO: 0 %
ERYTHROCYTE [DISTWIDTH] IN BLOOD BY AUTOMATED COUNT: 12.4 % (ref 10–15)
ETHANOL SERPL-MCNC: <0.01 G/DL
GFR SERPL CREATININE-BSD FRML MDRD: 90 ML/MIN/1.73M2
GLUCOSE BLD-MCNC: 97 MG/DL (ref 70–99)
HCT VFR BLD AUTO: 40.7 % (ref 35–47)
HGB BLD-MCNC: 14.1 G/DL (ref 11.7–15.7)
IMM GRANULOCYTES # BLD: 0 10E3/UL
IMM GRANULOCYTES NFR BLD: 0 %
LYMPHOCYTES # BLD AUTO: 1.9 10E3/UL (ref 0.8–5.3)
LYMPHOCYTES NFR BLD AUTO: 32 %
MCH RBC QN AUTO: 32.5 PG (ref 26.5–33)
MCHC RBC AUTO-ENTMCNC: 34.6 G/DL (ref 31.5–36.5)
MCV RBC AUTO: 94 FL (ref 78–100)
MONOCYTES # BLD AUTO: 0.4 10E3/UL (ref 0–1.3)
MONOCYTES NFR BLD AUTO: 6 %
NEUTROPHILS # BLD AUTO: 3.6 10E3/UL (ref 1.6–8.3)
NEUTROPHILS NFR BLD AUTO: 62 %
NRBC # BLD AUTO: 0 10E3/UL
NRBC BLD AUTO-RTO: 0 /100
PLATELET # BLD AUTO: 197 10E3/UL (ref 150–450)
POTASSIUM BLD-SCNC: 4 MMOL/L (ref 3.4–5.3)
PROT SERPL-MCNC: 7.6 G/DL (ref 6.8–8.8)
RBC # BLD AUTO: 4.34 10E6/UL (ref 3.8–5.2)
SARS-COV-2 RNA RESP QL NAA+PROBE: NEGATIVE
SODIUM SERPL-SCNC: 136 MMOL/L (ref 133–144)
WBC # BLD AUTO: 5.9 10E3/UL (ref 4–11)

## 2022-11-03 PROCEDURE — 71046 X-RAY EXAM CHEST 2 VIEWS: CPT

## 2022-11-03 PROCEDURE — 250N000013 HC RX MED GY IP 250 OP 250 PS 637: Performed by: PSYCHIATRY & NEUROLOGY

## 2022-11-03 PROCEDURE — 82077 ASSAY SPEC XCP UR&BREATH IA: CPT | Performed by: EMERGENCY MEDICINE

## 2022-11-03 PROCEDURE — 99220 PR INITIAL OBSERVATION CARE,LEVEL III: CPT | Performed by: PSYCHIATRY & NEUROLOGY

## 2022-11-03 PROCEDURE — 85004 AUTOMATED DIFF WBC COUNT: CPT | Performed by: EMERGENCY MEDICINE

## 2022-11-03 PROCEDURE — C9803 HOPD COVID-19 SPEC COLLECT: HCPCS

## 2022-11-03 PROCEDURE — G0378 HOSPITAL OBSERVATION PER HR: HCPCS

## 2022-11-03 PROCEDURE — 80053 COMPREHEN METABOLIC PANEL: CPT | Performed by: EMERGENCY MEDICINE

## 2022-11-03 PROCEDURE — 90791 PSYCH DIAGNOSTIC EVALUATION: CPT

## 2022-11-03 PROCEDURE — 36415 COLL VENOUS BLD VENIPUNCTURE: CPT | Performed by: EMERGENCY MEDICINE

## 2022-11-03 PROCEDURE — 250N000013 HC RX MED GY IP 250 OP 250 PS 637: Performed by: EMERGENCY MEDICINE

## 2022-11-03 PROCEDURE — 99285 EMERGENCY DEPT VISIT HI MDM: CPT | Mod: 25

## 2022-11-03 PROCEDURE — U0003 INFECTIOUS AGENT DETECTION BY NUCLEIC ACID (DNA OR RNA); SEVERE ACUTE RESPIRATORY SYNDROME CORONAVIRUS 2 (SARS-COV-2) (CORONAVIRUS DISEASE [COVID-19]), AMPLIFIED PROBE TECHNIQUE, MAKING USE OF HIGH THROUGHPUT TECHNOLOGIES AS DESCRIBED BY CMS-2020-01-R: HCPCS | Performed by: EMERGENCY MEDICINE

## 2022-11-03 RX ORDER — CHOLECALCIFEROL (VITAMIN D3) 50 MCG
2000 TABLET ORAL DAILY
Status: DISCONTINUED | OUTPATIENT
Start: 2022-11-04 | End: 2022-11-05 | Stop reason: HOSPADM

## 2022-11-03 RX ORDER — GABAPENTIN 600 MG/1
600 TABLET ORAL 2 TIMES DAILY
Status: DISCONTINUED | OUTPATIENT
Start: 2022-11-03 | End: 2022-11-05 | Stop reason: HOSPADM

## 2022-11-03 RX ORDER — CLONIDINE HYDROCHLORIDE 0.1 MG/1
0.1 TABLET ORAL 3 TIMES DAILY PRN
Status: DISCONTINUED | OUTPATIENT
Start: 2022-11-03 | End: 2022-11-03

## 2022-11-03 RX ORDER — CLONIDINE HYDROCHLORIDE 0.1 MG/1
0.1 TABLET ORAL 2 TIMES DAILY
Status: DISCONTINUED | OUTPATIENT
Start: 2022-11-03 | End: 2022-11-05 | Stop reason: HOSPADM

## 2022-11-03 RX ORDER — DIAZEPAM 10 MG/2ML
5-10 INJECTION, SOLUTION INTRAMUSCULAR; INTRAVENOUS EVERY 30 MIN PRN
Status: DISCONTINUED | OUTPATIENT
Start: 2022-11-03 | End: 2022-11-05 | Stop reason: HOSPADM

## 2022-11-03 RX ORDER — IBUPROFEN 200 MG
950 CAPSULE ORAL DAILY
Status: DISCONTINUED | OUTPATIENT
Start: 2022-11-04 | End: 2022-11-05 | Stop reason: HOSPADM

## 2022-11-03 RX ORDER — DIAZEPAM 5 MG
10 TABLET ORAL EVERY 30 MIN PRN
Status: DISCONTINUED | OUTPATIENT
Start: 2022-11-03 | End: 2022-11-05 | Stop reason: HOSPADM

## 2022-11-03 RX ORDER — FUROSEMIDE 20 MG
20 TABLET ORAL DAILY
Status: DISCONTINUED | OUTPATIENT
Start: 2022-11-03 | End: 2022-11-05 | Stop reason: HOSPADM

## 2022-11-03 RX ORDER — ACETAMINOPHEN 500 MG
500 TABLET ORAL EVERY 6 HOURS PRN
Status: DISCONTINUED | OUTPATIENT
Start: 2022-11-03 | End: 2022-11-05 | Stop reason: HOSPADM

## 2022-11-03 RX ORDER — LISINOPRIL 10 MG/1
10 TABLET ORAL DAILY
COMMUNITY

## 2022-11-03 RX ORDER — LISINOPRIL 10 MG/1
10 TABLET ORAL DAILY
Status: DISCONTINUED | OUTPATIENT
Start: 2022-11-03 | End: 2022-11-05 | Stop reason: HOSPADM

## 2022-11-03 RX ORDER — MULTIPLE VITAMINS W/ MINERALS TAB 9MG-400MCG
1 TAB ORAL DAILY
Status: DISCONTINUED | OUTPATIENT
Start: 2022-11-03 | End: 2022-11-05 | Stop reason: HOSPADM

## 2022-11-03 RX ORDER — PAROXETINE 20 MG/1
20 TABLET, FILM COATED ORAL DAILY
Status: DISCONTINUED | OUTPATIENT
Start: 2022-11-03 | End: 2022-11-05 | Stop reason: HOSPADM

## 2022-11-03 RX ORDER — LORAZEPAM 1 MG/1
1 TABLET ORAL
Status: COMPLETED | OUTPATIENT
Start: 2022-11-03 | End: 2022-11-03

## 2022-11-03 RX ORDER — TRAZODONE HYDROCHLORIDE 50 MG/1
50 TABLET, FILM COATED ORAL AT BEDTIME
Status: DISCONTINUED | OUTPATIENT
Start: 2022-11-03 | End: 2022-11-04

## 2022-11-03 RX ORDER — QUETIAPINE FUMARATE 25 MG/1
25 TABLET, FILM COATED ORAL
Status: DISCONTINUED | OUTPATIENT
Start: 2022-11-03 | End: 2022-11-05 | Stop reason: HOSPADM

## 2022-11-03 RX ADMIN — GABAPENTIN 600 MG: 600 TABLET, FILM COATED ORAL at 21:28

## 2022-11-03 RX ADMIN — NICOTINE POLACRILEX 2 MG: 2 GUM, CHEWING ORAL at 14:12

## 2022-11-03 RX ADMIN — LORAZEPAM 1 MG: 1 TABLET ORAL at 11:25

## 2022-11-03 RX ADMIN — ACETAMINOPHEN 500 MG: 500 TABLET ORAL at 14:12

## 2022-11-03 RX ADMIN — ACETAMINOPHEN 500 MG: 500 TABLET ORAL at 19:52

## 2022-11-03 RX ADMIN — MULTIPLE VITAMINS W/ MINERALS TAB 1 TABLET: TAB at 14:12

## 2022-11-03 RX ADMIN — LISINOPRIL 10 MG: 10 TABLET ORAL at 19:46

## 2022-11-03 RX ADMIN — BENZOCAINE 6 MG-MENTHOL 10 MG LOZENGES 1 LOZENGE: at 22:12

## 2022-11-03 RX ADMIN — TRAZODONE HYDROCHLORIDE 50 MG: 50 TABLET ORAL at 21:28

## 2022-11-03 RX ADMIN — FUROSEMIDE 20 MG: 20 TABLET ORAL at 19:46

## 2022-11-03 RX ADMIN — PAROXETINE HYDROCHLORIDE 20 MG: 20 TABLET, FILM COATED ORAL at 19:46

## 2022-11-03 RX ADMIN — CLONIDINE HYDROCHLORIDE 0.1 MG: 0.1 TABLET ORAL at 21:28

## 2022-11-03 ASSESSMENT — ACTIVITIES OF DAILY LIVING (ADL)
ADLS_ACUITY_SCORE: 35
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
## 2022-11-03 NOTE — ED PROVIDER NOTES
Visit Date: 11/03/2022    CHIEF COMPLAINT:  Fall.    HISTORY OF PRESENT ILLNESS:  This is a 57-year-old woman who presents to the Emergency Department with complaints of right rib pain.  She states she had been drinking and fell and may have hurt her ribs.  She has been having a lot of stress at home.  She has a  who is as medical problems and she has been using alcohol to deal with this.  She has been drinking on a regular basis for some time.  She has also been feeling depressed and thought about not wanting to live anymore and even thought about going in her car and doing something there.  She does not have a therapist or psychiatrist she sees, although she does take Ambien to help her sleep and looks like trazodone at times as well.  She has never gone through alcohol withdrawal or had the DTs.    PAST MEDICAL HISTORY:  Includes breast cancer, diabetes, hypertension, reflux disease.  She has had a gastric sleeve surgery.    MEDICATIONS:  As previously noted, also includes:  1.  Lasix.  2.  Zestril.  3.  Omeprazole.    FAMILY AND SOCIAL HISTORY:  As noted above.  She has also started smoking.    REVIEW OF SYSTEMS:  As noted with all other systems negative.    PHYSICAL EXAMINATION:    GENERAL:  Reveals a middle-aged female, in no respiratory distress, although she is very anxious.  VITAL SIGNS:  Initial blood pressure 122/117, temperature 98, pulse 80, respirations 18, pulse ox 96% on room air.  HEENT:  Pupils equal, reactive.  Extraocular movements intact.  Nares and oropharynx are clear.  NECK:  The neck veins are flat.  LUNGS:  Clear.  HEART:  Heart sounds are regular, without murmur, rubs, or gallops.  ABDOMEN:  Soft, no tenderness or masses.  MUSCULOSKELETAL:  No swelling.  The patient burst into tears stating she does not feel she wants to live anymore. Right anterior chest wall reveals tenderness to palpation, but there is no bruising, no fluctuance, and no crepitance.   SKIN:  No rash.  NEUROLOGIC:   Awake, alert, appropriate.  Fluent speech.  Normal motor and sensation.  PSYCHIATRIC:  The patient denies any visual or auditory hallucinations or any homicidal thought.    COURSE:  Labs were obtained.  Chemistries normal.  White count 59, hemoglobin 14, platelet 197.  Alcohol is 0.  COVID negative and chest x-ray shows no obvious fractures or suspicious abnormalities.  The patient was given 1 mg of Ativan and she is being transferred over to the EMPATH unit for further mental health evaluation.    IMPRESSION:     1.  Alcohol abuse.  2.  Depression with suicidal thoughts.  3.  Chest wall contusion.    Jayant Carrillo MD        D: 2022   T: 2022   MT: [id number]    Name:     ALLISON REYES  MRN:      [id number]        Account:    [id number]   :      1965           Visit Date: 2022     Document: [id number]

## 2022-11-03 NOTE — ED NOTES
Patient starts drinking every day after 15:00, if she does not drink she has the shakes.  I gave her Lorazepam 1 mg price going to [facility].
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
## 2022-11-03 NOTE — ED NOTES
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  She is depressed, anxious, hopeless and helpless.    Reports suicidal thoughts, with no plan.
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
## 2022-11-03 NOTE — ED NOTES
Pt resting in her chair through ost of the shift. Pt is blunt and flat. Pt is more hopeful and glad that she is kenia to stay and get a CD assessment. Pt is a bit more hopeful. Pt scored a 3 on CIWA. Pt has rib pain 5/10 stated it was better after Tylenol . Nursing to continue to monitor.

## 2022-11-03 NOTE — ED NOTES
Pt brought over to [facility] with increase anxiety, depression and SI. Pt initially came to the ED after experiencing a fall and felt that she had broken a rib. Pt was drinking last night and states she has been drinking 0.5 L of liquor daily for the past year. Pt is feeling very depressed,hopeless, helpless and not being kenia manage herself. Pt identifies stressors in her life as taking care of her  who is disabled and working from home. Pt is finding daily tasks unmanageable and she continues to feel overwhelmed. Pt has been drinking more an more and is feeling like this has been contributing to her problem. Pt reports never having any mental health tx of chemical dependency tx. Pt denies ever going through ETOH withdraws or DTs but does get shaky mid day. Pt is flat blunt and tearful during the interview. Pt is thinking she may need some formal tx for her ETOH use. Pt does admit to some SI with thoughts of using her car or taking her husbands pills. Pt states these thoughts have been increasing as of late. Nursing and risk assessments completed.  Assessments reviewed with [facility]HP and physician. Video monitoring in progress, patient informed.  Admission information reviewed with patient. Patient given a tour of [facility] and instructions on using the facility. Questions regarding [facility] addressed. Pt search completed and belongings inventoried.

## 2022-11-04 ENCOUNTER — HOSPITAL ENCOUNTER (OUTPATIENT)
Dept: BEHAVIORAL HEALTH | Facility: CLINIC | Age: 57
Discharge: HOME | End: 2022-11-04
Attending: FAMILY MEDICINE | Admitting: FAMILY MEDICINE
Payer: COMMERCIAL

## 2022-11-04 PROCEDURE — H0001 ALCOHOL AND/OR DRUG ASSESS: HCPCS | Mod: GT,95 | Performed by: COUNSELOR

## 2022-11-04 PROCEDURE — 99226 PR SUBSEQUENT OBSERVATION CARE,LEVEL III: CPT | Performed by: PSYCHIATRY & NEUROLOGY

## 2022-11-04 PROCEDURE — 250N000013 HC RX MED GY IP 250 OP 250 PS 637: Performed by: PSYCHIATRY & NEUROLOGY

## 2022-11-04 PROCEDURE — G0378 HOSPITAL OBSERVATION PER HR: HCPCS

## 2022-11-04 RX ORDER — TRAZODONE HYDROCHLORIDE 100 MG/1
200 TABLET ORAL AT BEDTIME
Status: DISCONTINUED | OUTPATIENT
Start: 2022-11-04 | End: 2022-11-05 | Stop reason: HOSPADM

## 2022-11-04 RX ADMIN — ACETAMINOPHEN 500 MG: 500 TABLET ORAL at 19:48

## 2022-11-04 RX ADMIN — NICOTINE POLACRILEX 2 MG: 2 GUM, CHEWING ORAL at 18:15

## 2022-11-04 RX ADMIN — LISINOPRIL 10 MG: 10 TABLET ORAL at 07:56

## 2022-11-04 RX ADMIN — MULTIPLE VITAMINS W/ MINERALS TAB 1 TABLET: TAB at 07:56

## 2022-11-04 RX ADMIN — PAROXETINE HYDROCHLORIDE 20 MG: 20 TABLET, FILM COATED ORAL at 08:49

## 2022-11-04 RX ADMIN — TRAZODONE HYDROCHLORIDE 200 MG: 100 TABLET ORAL at 21:02

## 2022-11-04 RX ADMIN — GABAPENTIN 600 MG: 600 TABLET, FILM COATED ORAL at 07:56

## 2022-11-04 RX ADMIN — CLONIDINE HYDROCHLORIDE 0.1 MG: 0.1 TABLET ORAL at 19:48

## 2022-11-04 RX ADMIN — GABAPENTIN 600 MG: 600 TABLET, FILM COATED ORAL at 19:48

## 2022-11-04 RX ADMIN — Medication 2000 UNITS: at 08:49

## 2022-11-04 RX ADMIN — Medication 950 MG: at 08:49

## 2022-11-04 RX ADMIN — FUROSEMIDE 20 MG: 20 TABLET ORAL at 08:49

## 2022-11-04 RX ADMIN — ACETAMINOPHEN 500 MG: 500 TABLET ORAL at 06:45

## 2022-11-04 RX ADMIN — CLONIDINE HYDROCHLORIDE 0.1 MG: 0.1 TABLET ORAL at 07:56

## 2022-11-04 ASSESSMENT — ACTIVITIES OF DAILY LIVING (ADL)
ADLS_ACUITY_SCORE: 35
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
## 2022-11-04 NOTE — ED NOTES
Pt woke up early this morning and was feeling anxious and worried about things that are going on at home. Pt is concerned about work and her . Pt was encouraged to call her  to day and talk to him to make sure he is doing ok and to talk to her employer. Pt was teary eyed this morning thinking about this. Pt is able to have SUDs assessment this morning. Pt will meet with LM and psychiatry to further discuss disposition.

## 2022-11-04 NOTE — ED PROVIDER NOTES
EmPATH Unit - Psychiatric Consultation  [facility] Emergency Department    Nicolle Naidu MRN: [id number]   Age: 57 year old YOB: 1965     History     Chief Complaint   Patient presents with     Rib Pain     Psychiatric Evaluation     Right lower rib pain started yesterday, may have fallen last night while intoxicated.  Reports that her drinking is out of control and she has been self medicating with alcohol.  Feelings of depression, not showering.       Suicidal     HPI  Nicolle Naidu is a 57 year old female with history notable for alcohol use disorder who is currently under observation status on the EmPATH unit for treatment of depressed mood, suicidal thoughts, and alcohol withdrawal.  Overnight, there were no acute issues.  On reassessment today, the patient recalls poor sleep while interpreting trazodone as offering minimal benefit at doses up to 100 mg nightly.  She clarifies that she takes paroxetine for treatment of hot flashes.  Suicidal thoughts have decreased in intensity, currently passive while able to contract for safety on the unit.  She does not wish to return home yet as she fears relapsing on alcohol and facing various psychosocial stressors.  There was no indication of psychosis or homicidal thoughts.  She expressed motivation for sobriety and mental health stability.  She is looking forward to residential MICD treatment.    Past Medical History  Past Medical History:   Diagnosis Date     Degenerative disorder of bone 2010    back; chronic     Past Surgical History:   Procedure Laterality Date     BREAST SURGERY       LAPAROSCOPIC GASTRIC SLEEVE       MASTECTOMY       TONSILLECTOMY       calcium citrate (CITRACAL) 950 (200 Ca) MG tablet  cholecalciferol 50 MCG (2000 UT) tablet  furosemide (LASIX) 20 MG tablet  lisinopril (ZESTRIL) 10 MG tablet  OMEPRAZOLE PO  PAROXETINE HCL PO  traZODone (DESYREL) 50 MG tablet  ATENOLOL PO  ATORVASTATIN CALCIUM PO  cyclobenzaprine (FLEXERIL) 10  "MG tablet  fluticasone (FLONASE) 50 MCG/ACT nasal spray  hydrochlorothiazide (MICROZIDE) 12.5 MG capsule  HYDROcodone-acetaminophen (NORCO) 5-325 MG per tablet  METFORMIN HCL PO  NAPROXEN PO  nicotine (NICODERM CQ) 14 MG/24HR patch 2h hr  nicotine (NICODERM CQ) 7 MG/24HR patch 2h hr  order for DME  pramipexole (MIRAPEX) 0.5 MG tablet  predniSONE (DELTASONE) 20 MG tablet  zolpidem (AMBIEN) 5 MG tablet      Allergies   Allergen Reactions     Wasp Venom Protein Shortness Of Breath     Bee Venom Swelling     Fever     Family History  No family history on file.  Social History   Social History     Tobacco Use     Smoking status: Every Day     Packs/day: 0.25     Years: 30.00     Pack years: 7.50     Types: Cigarettes     Smokeless tobacco: Never   Substance Use Topics     Alcohol use: Yes     Comment: vodka every day 1/4 liter a day for past 1.5 years.     Drug use: Never      Past medical history, past surgical history, medications, allergies, family history, and social history were reviewed with the patient. No additional pertinent items.       Review of Systems  A complete review of systems was performed with pertinent positives and negatives noted in the HPI, and all other systems negative.    Physical Examination   BP: (!) 182/117  Pulse: 88  Temp: 98.5  F (36.9  C)  Resp: 18  Height: 162.6 cm (5' 4\")  Weight: 77.6 kg (171 lb)  SpO2: 96 %    Physical Exam  General: Appears stated age.   Neuro: Alert and fully oriented. Extremities appear to demonstrate normal strength on visual inspection.   Integumentary/Skin: no rash visualized, normal color    Psychiatric Examination   Appearance: awake, alert  Attitude:  cooperative  Eye Contact:  fair  Mood:  depressed  Affect:  mood congruent  Speech:  clear, coherent  Psychomotor Behavior:  no evidence of tardive dyskinesia, dystonia, or tics  Thought Process:  logical and linear  Associations:  no loose associations  Thought Content:  no evidence of psychotic thought and " passive suicidal ideation present  Insight:  fair  Judgement:  intact  Oriented to:  time, person, and place  Attention Span and Concentration:  fair  Recent and Remote Memory:  fair  Language: able to name/identify objects without impairment  Fund of Knowledge: intact with awareness of current and past events    ED Course        Labs Ordered and Resulted from Time of ED Arrival to Time of ED Departure   COVID-19 VIRUS (CORONAVIRUS) BY PCR - Normal       Result Value    SARS CoV2 PCR Negative     COMPREHENSIVE METABOLIC PANEL - Normal    Sodium 136      Potassium 4.0      Chloride 103      Carbon Dioxide (CO2) 26      Anion Gap 7      Urea Nitrogen 11      Creatinine 0.77      Calcium 9.9      Glucose 97      Alkaline Phosphatase 81      AST 37      ALT 36      Protein Total 7.6      Albumin 3.9      Bilirubin Total 1.2      GFR Estimate 90     ETHYL ALCOHOL LEVEL - Normal    Alcohol ethyl <0.01     CBC WITH PLATELETS AND DIFFERENTIAL    WBC Count 5.9      RBC Count 4.34      Hemoglobin 14.1      Hematocrit 40.7      MCV 94      MCH 32.5      MCHC 34.6      RDW 12.4      Platelet Count 197      % Neutrophils 62      % Lymphocytes 32      % Monocytes 6      % Eosinophils 0      % Basophils 0      % Immature Granulocytes 0      NRBCs per 100 WBC 0      Absolute Neutrophils 3.6      Absolute Lymphocytes 1.9      Absolute Monocytes 0.4      Absolute Eosinophils 0.0      Absolute Basophils 0.0      Absolute Immature Granulocytes 0.0      Absolute NRBCs 0.0         Assessments & Plan (with Medical Decision Making)   Patient presenting with depressed mood and suicidal thoughts in the context of alcohol usage and various psychosocial stressors. Nursing notes reviewed noting no acute issues.     I have reviewed the assessment completed by the [facility].     Preliminary diagnosis:    ICD-10-CM    1. Suicidal ideation  R45.851       2. Depression, unspecified depression type  F32.A       3. Alcohol use disorder, severe, dependence  (H)  F10.20            Treatment Plan:  -Continue Paxil 20 mg daily; the patient takes this medication for treatment of hot flashes however if depressive symptoms persist, consider increasing the dose for antidepressant treatment.  Monitoring response as the patient proceeds with detox while anticipating additional therapeutic gains through MICD treatment.  -Increase trazodone from 100 mg to 200 mg nightly for treatment of insomnia.  If she experiences excessive morning sedation, lowering the dose to 150 mg may improve tolerability.  Maintain Seroquel as needed as a backup medication for insomnia if trazodone is not effective enough.  -Chemical health assessment was completed today.  Referrals have been initiated for residential MICD treatment.  -Continue observation status and reassess tomorrow.  If there is an extended wait time for admission to residential MICD treatment, consider transitioning to a crisis facility.    --  Alfonso Simms MD   [facility] EMERGENCY DEPT  EmPATH Unit  11/3/2022      Alfonso Simms MD  11/04/22 1028

## 2022-11-04 NOTE — ED NOTES
"[facility] Crisis Reassessment      Nicolle Naidu was reassessed for the following reasons: observation status reassessment. Pt was first seen on 11/3/2022 by Coeltte Bueno, MSW Intern; see the initial assessment note for details.      Patient Presentation    Initial ED presentation details: Rib pain, suicidal ideation, alcohol use    Current patient presentation: Patient was resting in her recliner with her eyes closed when writer approached to meet for reassessment.  Patient opened eyes when spoken to, accepted writer's introduction and agreed to meet in consult room.  Patient confirms that she did complete the [facility] assessment this morning and shared she advocated for residential level of MI/CD treatment as she does not feel IOP would be able to address her co-occurring concerns.  She also notes that returning home at this time is not something she is comfortable with and would prefer to work on her MI/CD concerns in a treatment setting.  Writer provided positive feedback for this self-advocacy and awareness of what would be most beneficial to her.     Patient reports feeling overwhelmed with everything that is happening in her life right now- work, relationship, family, home.  She wants to work on her mental health and knows that her current presentation is not solely mental or substance abuse, rather a combination of those two factors.  Patient shares that she was tearful this morning as the sense of being overwhelmed took over her mind, having an ongoing sense of dread, and being emotionally fatigued.  Writer offered validation for these feelings and provided hope for her future by reminding her that we will work together to come up with a plan of care that will be most supportive to her.      Patient endorses continued suicidal ideation this morning, rating the intensity an 8 out of 10 with 10 being the most intense.  Her plans for suicide are crashing the car \"because that is the easiest way yet I do not want to " "hurt anyone else\" while the second plan is overdosing on 's medications.  Patient denies engaging in any preparatory actions at this time.        Risk of Harm    Repeat ESS-6: Date of Completion 11/4/2022  1.a. Over the past 2 weeks, have you had thoughts of killing yourself? Yes  1.b. Have you ever attempted to kill yourself and, if yes, when did this last happen? No   2. Recent or current suicide plan? Yes crash car or overdose on pills   3. Recent or current intent to act on ideation? No  4. Lifetime psychiatric hospitalization? No  5. Pattern of excessive substance use? Yes  6. Current irritability, agitation, or aggression? No  Scoring note: BOTH 1a and 1b must be yes for it to score 1 point, if both are not yes it is zero. All others are 1 point per number. If all questions 1a/1b - 6 are no, risk is negligible. If one of 1a/1b is yes, then risk is mild. If either question 2 or 3, but not both, is yes, then risk is automatically moderate regardless of total score. If both 2 and 3 are yes, risk is automatically high regardless of total score.      Score: 2, moderate risk    Is the patient experiencing current suicidal ideation: Yes. Plan: crash car or overdose on meds but no intent    Does the patient have thoughts of harming others? No      Does the patient have access to lethal means? Yes - access to guns in the home and husbands medications. Reports the guns in the home can be locked up, but are not currently     Does the patient engage in non-suicidal self-injurious behavior (NSSI/SIB)? no    Mental Status Exam   Affect: Flat   Appearance: Appropriate    Attention Span/Concentration: Attentive?    Eye Contact: Engaged   Fund of Knowledge: Appropriate    Language /Speech Content: Fluent   Language /Speech Volume: Normal    Language /Speech Rate/Productions: Normal    Recent Memory: Intact   Remote Memory: Intact   Mood: Anxious, Depressed and Sad    Orientation to Person: Yes    Orientation to Place: " Yes   Orientation to Time of Day: Yes    Orientation to Date: Yes    Situation (Do they understand why they are here?): Yes    Psychomotor Behavior: Normal    Thought Content: Suicidal   Thought Form: Intact       Additional Collateral Information   None      Therapeutic Intervention  The following therapeutic methodologies were employed when working with the patient: Establishing rapport, Active listening, Assess dimensions of crisis, Apply solution-focused therapy to address current crisis, Identify additional supports and alternative coping skills, Motivational Interviewing and Brief Supportive Therapy. Patient response to intervention: engaged and cooperative.    Diagnosis:   311 (F32.9) Unspecified Depressive Disorder -primary   300.00 (F41.9) Unspecified Anxiety Disorder    Substance-Related & Addictive Disorders 292.9 (F19.99) Unspecified Other or Unknown Substanace Related Disorder      Clinical Substantiation of Recommendations  Patient continues to express depression, sadness, anxiety, and physical pain with suicidal ideation and plan.  She reports sleeping well last night for the first time in a long time however continues to struggle with current depression, alcohol use, and stressful home environment.  Patient completed [facility] assessment this morning which resulted in recommendation for Residential MICD treatment.  Those referrals have been sent to the identified programs by [facility] .  Patient will stay in observation on EmPATH tonight and likely through the maximum length of stay.  Patient denies psychosis symptoms.     Plan:    Disposition  Recommended disposition: Substance Abuse Disorder Treatment      Reviewed case and recommendations with attending provider. Attending Name: Mendez      Attending concurs with disposition: Yes      Patient concurs with disposition: Yes      Final disposition: Other: continue in observation status on EmPATH until max time here or transfer directly to MICD treatment.          Assessment Details  Total duration spent on the patient case in minutes: .50 hrs     CPT code(s) utilized: 82049 - Psychotherapy (with patient) - 30 (16-37*) min       BALDO Landin, MSW  Callback: [phone]

## 2022-11-05 VITALS
BODY MASS INDEX: 28.56 KG/M2 | HEART RATE: 68 BPM | OXYGEN SATURATION: 98 % | WEIGHT: 167.3 LBS | RESPIRATION RATE: 16 BRPM | SYSTOLIC BLOOD PRESSURE: 122 MMHG | DIASTOLIC BLOOD PRESSURE: 85 MMHG | TEMPERATURE: 98.2 F | HEIGHT: 64 IN

## 2022-11-05 PROCEDURE — 250N000013 HC RX MED GY IP 250 OP 250 PS 637: Performed by: PSYCHIATRY & NEUROLOGY

## 2022-11-05 PROCEDURE — G0378 HOSPITAL OBSERVATION PER HR: HCPCS

## 2022-11-05 PROCEDURE — 99217 PR OBSERVATION CARE DISCHARGE: CPT | Performed by: PSYCHIATRY & NEUROLOGY

## 2022-11-05 RX ORDER — QUETIAPINE FUMARATE 25 MG/1
25 TABLET, FILM COATED ORAL
Qty: 30 TABLET | Refills: 0 | Status: SHIPPED | OUTPATIENT
Start: 2022-11-05

## 2022-11-05 RX ORDER — GABAPENTIN 600 MG/1
600 TABLET ORAL 2 TIMES DAILY
Qty: 60 TABLET | Refills: 0 | Status: SHIPPED | OUTPATIENT
Start: 2022-11-05

## 2022-11-05 RX ORDER — CLONIDINE HYDROCHLORIDE 0.1 MG/1
0.1 TABLET ORAL 2 TIMES DAILY
Qty: 60 TABLET | Refills: 0 | Status: SHIPPED | OUTPATIENT
Start: 2022-11-05

## 2022-11-05 RX ORDER — TRAZODONE HYDROCHLORIDE 100 MG/1
200 TABLET ORAL AT BEDTIME
Qty: 60 TABLET | Refills: 0 | Status: SHIPPED | OUTPATIENT
Start: 2022-11-05

## 2022-11-05 RX ADMIN — MULTIPLE VITAMINS W/ MINERALS TAB 1 TABLET: TAB at 09:28

## 2022-11-05 RX ADMIN — Medication 950 MG: at 09:28

## 2022-11-05 RX ADMIN — GABAPENTIN 600 MG: 600 TABLET, FILM COATED ORAL at 09:28

## 2022-11-05 RX ADMIN — LISINOPRIL 10 MG: 10 TABLET ORAL at 09:28

## 2022-11-05 RX ADMIN — CLONIDINE HYDROCHLORIDE 0.1 MG: 0.1 TABLET ORAL at 09:28

## 2022-11-05 RX ADMIN — PAROXETINE HYDROCHLORIDE 20 MG: 20 TABLET, FILM COATED ORAL at 09:29

## 2022-11-05 RX ADMIN — FUROSEMIDE 20 MG: 20 TABLET ORAL at 09:28

## 2022-11-05 RX ADMIN — ACETAMINOPHEN 500 MG: 500 TABLET ORAL at 09:26

## 2022-11-05 RX ADMIN — QUETIAPINE FUMARATE 25 MG: 25 TABLET ORAL at 02:50

## 2022-11-05 RX ADMIN — Medication 2000 UNITS: at 09:30

## 2022-11-05 ASSESSMENT — ACTIVITIES OF DAILY LIVING (ADL)
ADLS_ACUITY_SCORE: 35

## 2022-11-05 NOTE — PROGRESS NOTES
On Obs status, A&O, VSS, calm and cooperative,Tylenol x 1 for R Rib age pain, tolerating diet,  stated that she is feeling better and improving, contracted for safety, denies SI and hallucination, Trazodone adjusted to 200 mg at bedtime, plan to discharge to a treatment facility, continue to monitor.

## 2022-11-05 NOTE — DISCHARGE INSTRUCTIONS
Medication appointment:    Jose BENAVIDES CNP,RN [facility]  [phone]  Telepsych    Therapy appointment:  Jenny Pichardo MA, LP [facility]  [address] [place] [address], [phone]/10/2022 11:00 am  Teletherapy    AA meetings near you:  [email or web address]      Aftercare Plan  If I am feeling unsafe or I am in a crisis, I will:   Contact my established care providers   Call the National Suicide Prevention Lifeline: 988  Go to the nearest emergency room   Call 911     Warning signs that I or other people might notice when a crisis is developing for me: I start talking with my hands, I get angry; raise my voice; starting yelling.     Things I am able to do on my own to cope or help me feel better: Remove myself from the situation, binge watch my favorite shows on [employer].       Things that I am able to do with others to cope or help me better: Talk about my feelings, talk to my son or daughter.       Things I can use or do for distraction: Plan on my phone, go on Facebook or [employer].     Changes I can make to support my mental health and wellness: Get better at recognizing when things are happening (triggers), take a break from the situation instead of getting sucked into it.       People in my life that I can ask for help: Daughter and son.      Your [facility] has a mental health crisis team you can call 24/7: [facility] Mobile Crisis  [phone]     Other things that are important when I'm in crisis: Attend AA meetings.  Consider getting a sponsor.       Additional resources and information:      Medication appointment:  Jose BENAVIDES CNP,RN [facility]  [address], [phone]  Telepsych    Therapy appointment:  Jenny Pichardo MA, LP [facility]  [address], [phone]/10/2022 11:00 am  Teletherapy    Jennifer Castelan  "Crisis Residence: [phone]  [facility] Crisis Residence: [phone]  [facility] Kline Crisis Residence: [phone]      Crisis Lines  Crisis Text Line  Text 702682  You will be connected with a trained live crisis counselor to provide support.    Por espanol, texto  JADA a 376817 o texto a 442-AYUDAME en WhatsApp    [employer] (LGBTQ Youth Crisis Line)  0.639.300.4414  text START to 053-163      [employer]  Fast Tracker  Linking people to mental health and substance use disorder resources  REPUBLIC [employer]     [place] Mental [facility] Warm Line  Peer to peer support  Monday thru Saturday, 12 pm to 10 pm  887.921.0879 or 8.943.473.8471  Text \"Support\" to 14117    National [place] on Mental [employer]ness (LONNIE)  838.927.6688 or 1.888.LONNIE.HELPS      Mental Health Apps  My3  https://[employer].org/    VirtualHopeBox  https://Comm[employer]/apps/virtual-hope-box/    Additional Information  Today you were seen by a licensed mental health professional through Triage and Transition services, [facility] ([facility])  for a crisis assessment in the Emergency Department at [facility].  It is recommended that you follow up with your established providers (psychiatrist, mental health therapist, and/or primary care doctor - as relevant) as soon as possible. Coordinators from [facility] will be calling you in the next 24-48 hours to ensure that you have the resources you need.  You can also contact [facility] coordinators directly at [phone]. You may have been scheduled for or offered an appointment with a mental health provider. [facility] maintains an extensive network of licensed behavioral health providers to connect patients with the services they need.  We do not charge providers a fee to participate in our referral network.  We match patients with providers based on a patient's specific needs, insurance coverage, and location.  Our first effort will be to refer you to a provider within " your care system, and will utilize providers outside your care system as needed.

## 2022-11-05 NOTE — ED PROVIDER NOTES
EmPATH Unit - Psychiatric Observation Discharge Summary  [facility] Emergency Department  Discharge Date: 11/5/2022    Nicolle Naidu MRN: [id number]   Age: 57 year old YOB: 1965     Brief HPI & Initial ED Course     Chief Complaint   Patient presents with     Rib Pain     Psychiatric Evaluation     Right lower rib pain started yesterday, may have fallen last night while intoxicated.  Reports that her drinking is out of control and she has been self medicating with alcohol.  Feelings of depression, not showering.       Suicidal     HPI  Nicolle Naidu is a 57 year old female with history notable for depression, anxiety, insomnia, and alcohol abuse who presents to the ER with worsening mental health symptoms in the context of alcohol dependence with a desire to stop drinking.   Pt was medically evaluated and cleared in the emergency room prior to transfer to the EmPATH unit.  Any labs and imaging completed in the emergency room have been reviewed.    11/05/2022:  Patient seen on the unit today prior to discharge.  Patient reports overall doing quite a bit better.  This provider saw patient on Thursday and agrees that patient appears to be doing much better today.  Patient reported sleeping better last night with increased dose of trazodone and quetiapine 25 mg.  Patient has also found clonidine and gabapentin helpful for any alcohol withdrawal symptoms, anxiety.  Patient reports feeling more hopeful.  She read some material given to her about narcissistic personality disorder and finds it relates to her experience with her .  She is looking forward to starting therapy to move forward on how she wants to proceed with her marriage.  She is hopeful her  might change as she is making changes.  Had some passive suicidal ideation this morning but reports it was quite fleeting and she was able to talk herself out of it and think about the people she loves including her kids and her  granddaughter.  She is hopeful to get into chemical dependency treatment and would like to go home until she receives a phone call about one of the placements.  Patient medication adherent.  Tolerating medications well.  Denies current acute safety concerns.  No current suicidal ideation.  Family reports one gun has been removed from the home months ago.  Motivated to maintain sobriety.    11/4/2022 Per Dr. Simms:  [facility]  Nicolle Naidu is a 57 year old female with history notable for alcohol use disorder who [facility] depressed mood, suicidal thoughts, and alcohol withdrawal.  Overnight, there were no acute issues.  On reassessment today, the patient recalls poor sleep while interpreting trazodone as offering minimal benefit at doses up to 100 mg nightly.  She clarifies that she takes paroxetine for treatment of hot flashes.  Suicidal thoughts have decreased in intensity, currently passive while able to contract for safety on the unit.  She does not wish to return home yet as she fears relapsing on alcohol and facing various psychosocial stressors.  There was no indication of psychosis or homicidal thoughts.  She expressed motivation for sobriety and mental health stability.  She is looking forward to residential MICD treatment.    11/3/2022 by Nanci Snell DO:  Patient with history of depression, anxiety, insomnia, and increased psychosocial stressors.  Patient presented to the emergency room due to a recent fall that she does not even remember.  She hurt her ribs and had an evaluation to make sure she did not do any damage to her ribs.  She decided to address her alcohol use and mental health symptoms.  Patient reports she has been self-medicating with alcohol.  Patient has increased alcohol use over the past year and a half.  Patient admits to drinking alcohol before this time but denies any concerning use except for this past year and a half.   "Has been drinking roughly half a liter a day.  Has been taking trazodone for sleep but reports it stopped being effective about a year ago.  Patient has now not been sleeping well for quite some time.  She has had some significant stressors lately but reports the most distressing thing she is experiencing is her alcohol use.  \"It is painful to see myself the way I am right now.\"  \"I am out of control.\"  Patient agreeable to staying overnight to address her drinking concerns in the morning with a chemical dependency assessment.  She has had suicidal ideation with a plan.  See below for additional details.  She denies auditory and visual hallucinations.  No other drug use.  Denies significant withdrawal symptoms that she is aware of but does report sometimes feeling shaky, higher anxiety, with insomnia.      Physical Examination   BP: 122/85  Pulse: 68  Temp: 98.2  F (36.8  C)  Resp: 16  Height: 162.6 cm (5' 4\")  Weight: 75.9 kg (167 lb 4.8 oz)  SpO2: 98 %    Physical Exam  General: Appears stated age.   Neuro: Alert and fully oriented. Extremities appear to demonstrate normal strength on visual inspection.   Integumentary/Skin: no rash visualized, normal color    Psychiatric Examination   Appearance: awake, alert, adequately groomed and appeared as age stated  Attitude:  cooperative  Eye Contact:  good  Mood:  better  Affect:  appropriate and in normal range and mood congruent  Speech:  clear, coherent  Psychomotor Behavior:  no evidence of tardive dyskinesia, dystonia, or tics  Thought Process:  logical, linear and goal oriented  Associations:  no loose associations  Thought Content:  no evidence of suicidal ideation or homicidal ideation, no evidence of psychotic thought, no auditory hallucinations present and no visual hallucinations present  Insight:  good  Judgement:  intact  Oriented to:  time, person, and place  Attention Span and Concentration:  intact  Recent and Remote Memory:  intact  Language: able to " name/identify objects without impairment  Fund of Knowledge: intact with awareness of current and past events    Results        Labs Ordered and Resulted from Time of ED Arrival to Time of ED Departure   COVID-19 VIRUS (CORONAVIRUS) BY PCR - Normal       Result Value    SARS CoV2 PCR Negative     COMPREHENSIVE METABOLIC PANEL - Normal    Sodium 136      Potassium 4.0      Chloride 103      Carbon Dioxide (CO2) 26      Anion Gap 7      Urea Nitrogen 11      Creatinine 0.77      Calcium 9.9      Glucose 97      Alkaline Phosphatase 81      AST 37      ALT 36      Protein Total 7.6      Albumin 3.9      Bilirubin Total 1.2      GFR Estimate 90     ETHYL ALCOHOL LEVEL - Normal    Alcohol ethyl <0.01     CBC WITH PLATELETS AND DIFFERENTIAL    WBC Count 5.9      RBC Count 4.34      Hemoglobin 14.1      Hematocrit 40.7      MCV 94      MCH 32.5      MCHC 34.6      RDW 12.4      Platelet Count 197      % Neutrophils 62      % Lymphocytes 32      % Monocytes 6      % Eosinophils 0      % Basophils 0      % Immature Granulocytes 0      NRBCs per 100 WBC 0      Absolute Neutrophils 3.6      Absolute Lymphocytes 1.9      Absolute Monocytes 0.4      Absolute Eosinophils 0.0      Absolute Basophils 0.0      Absolute Immature Granulocytes 0.0      Absolute NRBCs 0.0         Observation Course   The patient was found to have a psychiatric condition that would benefit from an observation stay in the emergency department for further psychiatric stabilization and/or coordination of a safe disposition. The plan upon observation admission included serial assessments of psychiatric condition, potential administration of medications if indicated, further disposition pending the patient's psychiatric course during the monitoring period.     Serial assessments of the patient's psychiatric condition were performed. Nursing notes were reviewed. During the observation period, the patient did not require medications for agitation, and did not  require restraints/seclusion for patient and/or provider safety.     After a period of working with the treatment team on the EmPATH unit, the patient's mental state improved to allow a safe transition to outpatient care. After counseling on the diagnosis, work-up, and treatment plan, the patient was discharged. Close follow-up with a psychiatrist and/or therapist was recommended and community psychiatric resources were provided. Patient is to return to the ED if any urgent or potentially life-threatening concerns.     Discharge Diagnoses:   Final diagnoses:   Suicidal ideation   Depression, unspecified depression type   Alcohol use disorder, severe, dependence (H)       Treatment Plan:  -Discharge Home  -Continue to maintain sobriety from alcohol and all mood altering substances  -Continue Paxil 20 mg daily for hot flashes and some mood, anxiety sxs. Could consider dose increase in future but will first see how symptoms change on increased dose of trazodone.   -Continue trazodone 200 mg at bedtime as needed for sleep.   -Continue gabapentin 600 mg BID for alcohol use disorder, alcohol cravings and post acute ETOH withdrawal symptoms.   -Continue clonidine 0.1 mg BID for anxiety, post-acute ETOH withdrawal symptoms.   -Start quetiapine/Seroquel 25 mg at bedtime as needed for sleep. Discussed risks for metabolic abnormalities and movement disorders.   -Medication education provided this visit including but not limited to: Rationale for medication, importance of medication adherence, medication interactions, common medication side effects, benefits of medications.  -Problem focused supportive therapy and education provided today related to patient's current and acute stressors, symptoms, and diagnoses.  -Patient encouraged to follow with CD tx recommendations as planned.     At the time of discharge, the patient's acute suicide risk was determined to be low due to the following factors: Reduction in the intensity of  "mood/anxiety symptoms that preceded the admission, denial of suicidal thoughts, denies feeling helpless or hopeless, not currently under the influence of alcohol or illicit substances, denies experiencing command hallucinations, no immediate access to firearms. The patient's acute risk could be higher if noncompliant with their treatment plan, medications, follow-up appointments or using illicit substances or alcohol. Protective factors include: social supports, children, stable housing, employment.    --  Nanci Snell DO  [facility] EMERGENCY DEPT  EmPATH Unit  11/5/2022     This note was created with voice recognition software. Inadvertent grammatical errors, typographical errors, and \"sound-a-like\" substitutions may occur due to limitations of the software.  Read the note carefully and apply context when erroneous substitutions have occurred. Thank you.        Nanci Snell DO  11/05/22 1406    "

## 2022-11-05 NOTE — CONSULTS
[facility] Crisis Reassessment      iNcolle Naidu was reassessed for the following reasons: observation status reassessment. Pt was first seen on 11/3/22 by Colette Bueno, MSW intern; see the initial assessment note for details.      Patient Presentation    Initial ED presentation details: Rib pain secondary to a fall due to alcohol use and suicidal ideation.       Current patient presentation: Pt reports that her morning has not been the greatest, she was feeling anxious and thinking about her job this morning.  Pt reports having passive suicidal ideations this morning.  She reports that she has been feeling this way for months.  When asked if she felt her medications were helping, pt states that today was the first day in a month that she showered and saw that as a positive.      Changes observed since initial assessment: Mood has improved.  She has been assessed for CD tx and is awaiting to hear back from a couple of programs to determine when she can begin treatment.  She is forward thinking; agreeable to seeing a therapist and psychiatrist.  She is interested in beginning treatment.        Risk of Harm    Repeat ESS-6: Date of Completion 11/5/2022  1.a. Over the past 2 weeks, have you had thoughts of killing yourself? Yes  1.b. Have you ever attempted to kill yourself and, if yes, when did this last happen? No   2. Recent or current suicide plan? Yes pt states that she had had thoughts of overdosing on her husbands medications; denies any current SI.   3. Recent or current intent to act on ideation? No  4. Lifetime psychiatric hospitalization? No  5. Pattern of excessive substance use? Yes  6. Current irritability, agitation, or aggression? Yes  Scoring note: BOTH 1a and 1b must be yes for it to score 1 point, if both are not yes it is zero. All others are 1 point per number. If all questions 1a/1b - 6 are no, risk is negligible. If one of 1a/1b is yes, then risk is mild. If either question 2 or 3, but not both,  is yes, then risk is automatically moderate regardless of total score. If both 2 and 3 are yes, risk is automatically high regardless of total score.      Score: 4, moderate risk    Is the patient experiencing current suicidal ideation: No    Does the patient have thoughts of harming others? No      Does the patient have access to lethal means? No     Does the patient engage in non-suicidal self-injurious behavior (NSSI/SIB)? no     Does the patient have thoughts of harming others? No    Mental Status Exam   Affect: Appropriate   Appearance: Appropriate    Attention Span/Concentration: Attentive?    Eye Contact: Engaged   Fund of Knowledge: Appropriate    Language /Speech Content: Fluent   Language /Speech Volume: Normal    Language /Speech Rate/Productions: Articulate    Recent Memory: Intact   Remote Memory: Intact   Mood: Depressed    Orientation to Person: Yes    Orientation to Place: Yes   Orientation to Time of Day: Yes    Orientation to Date: Yes    Situation (Do they understand why they are here?): Yes    Psychomotor Behavior: Normal    Thought Content: Other: passive suicidal ideation.   Thought Form: Goal Directed and Intact       Additional Collateral Information   The following information was received from William Naidu whose relationship to the patient is son. Information was obtained via phone. Their phone number is [phone] and they last had contact with patient on Thursday.    What happened today: Mom is battling with depression and alcoholism.  Starting to effect her career as well.      What is different about patient's functioning: Progressively worsened.  Starts drinking around 3pm in the afternoon during the work week.  She goes to bed early but feels she's passed out.  Has night terrors or yells while sleeping but doesn't recall any of it during the day or while sober.  Sober M-F 7am-3pm.      Concern about alcohol/drug use: Yes drinks daily, more on the weekends. Goes through a 750ml  bottles a week.      What do you think the patient needs: To unpack with a therapist emotions that are built up inside of her.  They have been through a lot with the family.  Upset with her spouse; not over things that happened in the past.      Has patient made comments about wanting to kill themselves/others:  No    If d/c is recommended, can they take part in safety/aftercare planning: Yes Son and daughter are available to help.      Other information: Son reports there are no guns in the home; they sold their shot gun a few months ago.      Therapeutic Intervention  The following therapeutic methodologies were employed when working with the patient: Establishing rapport, Active listening, Apply solution-focused therapy to address current crisis, Identify additional supports and alternative coping skills, Establish a discharge plan and Safety planning. Patient response to intervention: calm and cooperative.    Diagnosis:   311 (F32.9) Unspecified Depressive Disorder -primary   300.00 (F41.9) Unspecified Anxiety Disorder    Substance-Related & Addictive Disorders 292.9 (F19.99) Unspecified Other or Unknown Substanace Related Disorder       Clinical Substantiation of Recommendations  Pt is not an imminent danger to herself or others.  Recommend CD tx, in the meantime establish care with a therapist and a psychiatrist both of which appointments were scheduled on pt's behalf.  Pt had a CD assessment yesterday is awaiting to hear back to see which facility has first available.      Plan:    Disposition  Recommended disposition: Individual Therapy, Medication Management and Substance Abuse Disorder Treatment      Reviewed case and recommendations with attending provider. Attending Name: Kyleigh Snell       Attending concurs with disposition: Yes      Patient concurs with disposition: Yes      Final disposition: Individual therapy , Medication management and Substance abuse disorder treatment .       Assessment  Details  Total duration spent on the patient case in minutes: .75 hrs     CPT code(s) utilized: 55596 - Psychotherapy (with patient) - 45 (38-52*) min       Mellissa Richter, [facility], [facility]  Callback: [phone]      Aftercare Plan  If I am feeling unsafe or I am in a crisis, I will:   Contact my established care providers   Call the National Suicide Prevention Lifeline: 988  Go to the nearest emergency room   Call 911     Warning signs that I or other people might notice when a crisis is developing for me: I start talking with my hands, I get angry; raise my voice; starting yelling.     Things I am able to do on my own to cope or help me feel better: Remove myself from the situation, binge watch my favorite shows on [employer].       Things that I am able to do with others to cope or help me better: Talk about my feelings, talk to my son or daughter.       Things I can use or do for distraction: Plan on my phone, go on Facebook or [employer].     Changes I can make to support my mental health and wellness: Get better at recognizing when things are happening (triggers), take a break from the situation instead of getting sucked into it.       People in my life that I can ask for help: Daughter and son.      Your [facility] has a mental health crisis team you can call 24/7: [facility] Crisis  [phone]     Other things that are important when I'm in crisis: Attend AA meetings.  Consider getting a sponsor.       Additional resources and information:      Medication appointment:  Jose Ordoñez  MSN  CNP,RN [facility]  [address] [place] [address], [phone]  Telepsych    Therapy appointment:  Jenny Pichardo MA   [facility]  [address] [place] [address], [phone]/10/2022 11:00 am  Teletherapy    [email or web address]    [facility]: [phone]  [facility]: [phone]  [facility]  "Residence: [phone]      Crisis Lines  Crisis Text Line  Text 651980  You will be connected with a trained live crisis counselor to provide support.    Por jackie, celioo  JADA a 031619 o texto a 442-AYUDAME en Whatnasima    [employer] (LGBTQ Youth Crisis Line)  0.398.052.5797  text START to 619-193      DaisyBill  Fast Tracker  Linking people to mental health and substance use disorder resources  [employer]     [place] Mental [facility] Warm Line  Peer to peer support  Monday thru Saturday, 12 pm to 10 pm  483.505.8533 or 8.952.345.6266  Text \"Support\" to 08443    National Lund on Mental Illness (LONNIE)  264.399.6132 or 1.888.LONNIE.HELPS      Mental Health Apps  My3  https://IIDpp.org/    VirtualHopeBox  https://Mingle360/apps/virtual-hope-box/    Additional Information  Today you were seen by a licensed mental health professional through Triage and Transition services, [facility] ([facility])  for a crisis assessment in the Emergency Department at [facility].  It is recommended that you follow up with your established providers (psychiatrist, mental health therapist, and/or primary care doctor - as relevant) as soon as possible. Coordinators from [facility] will be calling you in the next 24-48 hours to ensure that you have the resources you need.  You can also contact [facility] coordinators directly at [phone]. You may have been scheduled for or offered an appointment with a mental health provider. [facility] maintains an extensive network of licensed behavioral health providers to connect patients with the services they need.  We do not charge providers a fee to participate in our referral network.  We match patients with providers based on a patient's specific needs, insurance coverage, and location.  Our first effort will be to refer you to a provider within your care system, and will utilize providers outside your care system as needed.          "

## 2022-11-05 NOTE — ED NOTES
Pt feeling better this after this morning and stated she feels like she is in a better place then when she came in. Pt denies SI at this moment and feels like she has some resources to utilize. Pt Discharge instructions reviewed with patient including follow-up care plan. Educated on medication regime and advised not to stop prescribed medication without consulting their physician. Reviewed safety plan and outpatient resources.Denies SI. All belongings which where brought into the hospital have been returned to patient. Escorted off the unit at 1455. Discharged to self.

## 2023-01-07 ENCOUNTER — HEALTH MAINTENANCE LETTER (OUTPATIENT)
Age: 58
End: 2023-01-07

## 2023-04-22 ENCOUNTER — HEALTH MAINTENANCE LETTER (OUTPATIENT)
Age: 58
End: 2023-04-22

## 2023-07-15 ENCOUNTER — HEALTH MAINTENANCE LETTER (OUTPATIENT)
Age: 58
End: 2023-07-15

## 2024-02-10 ENCOUNTER — HEALTH MAINTENANCE LETTER (OUTPATIENT)
Age: 59
End: 2024-02-10

## 2024-09-07 ENCOUNTER — HEALTH MAINTENANCE LETTER (OUTPATIENT)
Age: 59
End: 2024-09-07

## 2024-10-09 ENCOUNTER — APPOINTMENT (OUTPATIENT)
Dept: CT IMAGING | Facility: CLINIC | Age: 59
End: 2024-10-09
Attending: EMERGENCY MEDICINE
Payer: COMMERCIAL

## 2024-10-09 ENCOUNTER — HOSPITAL ENCOUNTER (EMERGENCY)
Facility: CLINIC | Age: 59
Discharge: HOME | End: 2024-10-10
Attending: EMERGENCY MEDICINE | Admitting: EMERGENCY MEDICINE
Payer: COMMERCIAL

## 2024-10-09 DIAGNOSIS — K62.5 RECTAL BLEEDING: ICD-10-CM

## 2024-10-09 DIAGNOSIS — R10.9 ABDOMINAL PAIN, UNSPECIFIED ABDOMINAL LOCATION: ICD-10-CM

## 2024-10-09 DIAGNOSIS — R19.7 DIARRHEA, UNSPECIFIED TYPE: ICD-10-CM

## 2024-10-09 LAB
ABO/RH(D): NORMAL
ALBUMIN SERPL BCG-MCNC: 4 G/DL (ref 3.5–5.2)
ALP SERPL-CCNC: 69 U/L (ref 40–150)
ALT SERPL W P-5'-P-CCNC: 21 U/L (ref 0–50)
ANION GAP SERPL CALCULATED.3IONS-SCNC: 8 MMOL/L (ref 7–15)
ANTIBODY SCREEN: NEGATIVE
AST SERPL W P-5'-P-CCNC: 42 U/L (ref 0–45)
BASOPHILS # BLD AUTO: 0 10E3/UL (ref 0–0.2)
BASOPHILS NFR BLD AUTO: 0 %
BILIRUB SERPL-MCNC: 0.4 MG/DL
BUN SERPL-MCNC: 11.2 MG/DL (ref 6–20)
CALCIUM SERPL-MCNC: 9.5 MG/DL (ref 8.8–10.4)
CHLORIDE SERPL-SCNC: 108 MMOL/L (ref 98–107)
CREAT SERPL-MCNC: 0.81 MG/DL (ref 0.51–0.95)
EGFRCR SERPLBLD CKD-EPI 2021: 84 ML/MIN/1.73M2
EOSINOPHIL # BLD AUTO: 0.1 10E3/UL (ref 0–0.7)
EOSINOPHIL NFR BLD AUTO: 1 %
ERYTHROCYTE [DISTWIDTH] IN BLOOD BY AUTOMATED COUNT: 13 % (ref 10–15)
GLUCOSE SERPL-MCNC: 141 MG/DL (ref 70–99)
HCO3 SERPL-SCNC: 26 MMOL/L (ref 22–29)
HCT VFR BLD AUTO: 37.1 % (ref 35–47)
HGB BLD-MCNC: 12.6 G/DL (ref 11.7–15.7)
HOLD SPECIMEN: NORMAL
HOLD SPECIMEN: NORMAL
IMM GRANULOCYTES # BLD: 0 10E3/UL
IMM GRANULOCYTES NFR BLD: 0 %
LACTATE SERPL-SCNC: 1.3 MMOL/L (ref 0.7–2)
LIPASE SERPL-CCNC: 24 U/L (ref 13–60)
LYMPHOCYTES # BLD AUTO: 2.9 10E3/UL (ref 0.8–5.3)
LYMPHOCYTES NFR BLD AUTO: 43 %
MCH RBC QN AUTO: 29.6 PG (ref 26.5–33)
MCHC RBC AUTO-ENTMCNC: 34 G/DL (ref 31.5–36.5)
MCV RBC AUTO: 87 FL (ref 78–100)
MONOCYTES # BLD AUTO: 0.4 10E3/UL (ref 0–1.3)
MONOCYTES NFR BLD AUTO: 5 %
NEUTROPHILS # BLD AUTO: 3.4 10E3/UL (ref 1.6–8.3)
NEUTROPHILS NFR BLD AUTO: 50 %
NRBC # BLD AUTO: 0 10E3/UL
NRBC BLD AUTO-RTO: 0 /100
PLATELET # BLD AUTO: 218 10E3/UL (ref 150–450)
POTASSIUM SERPL-SCNC: 4.3 MMOL/L (ref 3.4–5.3)
PROT SERPL-MCNC: 6.7 G/DL (ref 6.4–8.3)
RBC # BLD AUTO: 4.26 10E6/UL (ref 3.8–5.2)
SODIUM SERPL-SCNC: 142 MMOL/L (ref 135–145)
SPECIMEN EXPIRATION DATE: NORMAL
WBC # BLD AUTO: 6.8 10E3/UL (ref 4–11)

## 2024-10-09 PROCEDURE — 86901 BLOOD TYPING SEROLOGIC RH(D): CPT | Performed by: EMERGENCY MEDICINE

## 2024-10-09 PROCEDURE — 250N000009 HC RX 250: Performed by: EMERGENCY MEDICINE

## 2024-10-09 PROCEDURE — 85025 COMPLETE CBC W/AUTO DIFF WBC: CPT | Performed by: EMERGENCY MEDICINE

## 2024-10-09 PROCEDURE — 80053 COMPREHEN METABOLIC PANEL: CPT | Performed by: EMERGENCY MEDICINE

## 2024-10-09 PROCEDURE — 82040 ASSAY OF SERUM ALBUMIN: CPT | Performed by: EMERGENCY MEDICINE

## 2024-10-09 PROCEDURE — 83690 ASSAY OF LIPASE: CPT | Performed by: EMERGENCY MEDICINE

## 2024-10-09 PROCEDURE — 36415 COLL VENOUS BLD VENIPUNCTURE: CPT | Performed by: EMERGENCY MEDICINE

## 2024-10-09 PROCEDURE — 96374 THER/PROPH/DIAG INJ IV PUSH: CPT | Mod: 59

## 2024-10-09 PROCEDURE — 99285 EMERGENCY DEPT VISIT HI MDM: CPT | Mod: 25

## 2024-10-09 PROCEDURE — 250N000011 HC RX IP 250 OP 636: Performed by: EMERGENCY MEDICINE

## 2024-10-09 PROCEDURE — 96375 TX/PRO/DX INJ NEW DRUG ADDON: CPT

## 2024-10-09 PROCEDURE — 86900 BLOOD TYPING SEROLOGIC ABO: CPT | Performed by: EMERGENCY MEDICINE

## 2024-10-09 PROCEDURE — 74177 CT ABD & PELVIS W/CONTRAST: CPT

## 2024-10-09 PROCEDURE — 83605 ASSAY OF LACTIC ACID: CPT | Performed by: EMERGENCY MEDICINE

## 2024-10-09 RX ORDER — IOPAMIDOL 755 MG/ML
93 INJECTION, SOLUTION INTRAVASCULAR ONCE
Status: COMPLETED | OUTPATIENT
Start: 2024-10-09 | End: 2024-10-09

## 2024-10-09 RX ORDER — ONDANSETRON 2 MG/ML
4 INJECTION INTRAMUSCULAR; INTRAVENOUS EVERY 30 MIN PRN
Status: DISCONTINUED | OUTPATIENT
Start: 2024-10-09 | End: 2024-10-10 | Stop reason: HOSPADM

## 2024-10-09 RX ORDER — MORPHINE SULFATE 4 MG/ML
6 INJECTION, SOLUTION INTRAMUSCULAR; INTRAVENOUS
Status: DISCONTINUED | OUTPATIENT
Start: 2024-10-09 | End: 2024-10-10 | Stop reason: HOSPADM

## 2024-10-09 RX ADMIN — SODIUM CHLORIDE 67 ML: 9 INJECTION, SOLUTION INTRAVENOUS at 23:12

## 2024-10-09 RX ADMIN — MORPHINE SULFATE 6 MG: 4 INJECTION, SOLUTION INTRAMUSCULAR; INTRAVENOUS at 22:40

## 2024-10-09 RX ADMIN — IOPAMIDOL 93 ML: 755 INJECTION, SOLUTION INTRAVENOUS at 23:12

## 2024-10-09 RX ADMIN — ONDANSETRON 4 MG: 2 INJECTION INTRAMUSCULAR; INTRAVENOUS at 22:39

## 2024-10-09 ASSESSMENT — COLUMBIA-SUICIDE SEVERITY RATING SCALE - C-SSRS
1. IN THE PAST MONTH, HAVE YOU WISHED YOU WERE DEAD OR WISHED YOU COULD GO TO SLEEP AND NOT WAKE UP?: NO
6. HAVE YOU EVER DONE ANYTHING, STARTED TO DO ANYTHING, OR PREPARED TO DO ANYTHING TO END YOUR LIFE?: NO
2. HAVE YOU ACTUALLY HAD ANY THOUGHTS OF KILLING YOURSELF IN THE PAST MONTH?: NO

## 2024-10-09 ASSESSMENT — ACTIVITIES OF DAILY LIVING (ADL)
ADLS_ACUITY_SCORE: 35
ADLS_ACUITY_SCORE: 35

## 2024-10-10 VITALS
WEIGHT: 185 LBS | DIASTOLIC BLOOD PRESSURE: 97 MMHG | BODY MASS INDEX: 31.76 KG/M2 | RESPIRATION RATE: 16 BRPM | TEMPERATURE: 98.1 F | HEART RATE: 84 BPM | SYSTOLIC BLOOD PRESSURE: 138 MMHG | OXYGEN SATURATION: 95 %

## 2024-10-10 RX ORDER — ONDANSETRON 4 MG/1
4 TABLET, ORALLY DISINTEGRATING ORAL EVERY 8 HOURS PRN
Qty: 10 TABLET | Refills: 0 | Status: SHIPPED | OUTPATIENT
Start: 2024-10-10 | End: 2024-10-13

## 2024-10-10 NOTE — DISCHARGE INSTRUCTIONS
As we discussed, it is unclear based on your CT scan why you are having some bleeding, though this may simply be due to the diarrhea and underlying diverticulosis.  Please come back to the ER immediately with any other concerns you have, please use over-the-counter medication to help with your symptoms including pain medication, fever medication, and antidiarrheals if you feel that this is helpful.  We have also given you Zofran, you may use this to help with any nausea you have.

## 2024-10-10 NOTE — ED PROVIDER NOTES
Emergency Department Note      History of Present Illness     Chief Complaint  Rectal Bleeding    HPI  Nicolle Naidu is a 36 year old female who presents to the emergency room with 4 to 5 days of diarrhea and abdominal pain.  She initially states that she is having about 6-7 movements a day that is loose and without blood for the first 2 days, but now for the last 2 days she has actually had red-colored blood associated with her diarrhea and the pain is actually worse now.  States that she has no vomiting, abdominal pain is principal located in the upper half of her abdomen, and she also has no recent travel outside of the [place] and no recent antibiotic use.  She states that she has no other sick contacts in the family and does not have any type of allergies to food or history of colitis.       Independent Historian  Yes patient's son is at the bedside and confirms the above history.    Review of External Notes  Yes I have reviewed the patient's last visit with her family practice doctor on 25 September of this year the patient was seen for class I obesity as well as alcoholism in remission.      Past Medical History   Medical History and Problem List  Past Medical History:   Diagnosis Date    Degenerative disorder of bone 2010       Medications  ATENOLOL PO  ATORVASTATIN CALCIUM PO  calcium citrate (CITRACAL) 950 (200 Ca) MG tablet  cholecalciferol 50 MCG (2000 UT) tablet  cloNIDine (CATAPRES) 0.1 MG tablet  cyclobenzaprine (FLEXERIL) 10 MG tablet  fluticasone (FLONASE) 50 MCG/ACT nasal spray  furosemide (LASIX) 20 MG tablet  gabapentin (NEURONTIN) 600 MG tablet  hydrochlorothiazide (MICROZIDE) 12.5 MG capsule  HYDROcodone-acetaminophen (NORCO) 5-325 MG per tablet  lisinopril (ZESTRIL) 10 MG tablet  METFORMIN HCL PO  NAPROXEN PO  nicotine (NICODERM CQ) 14 MG/24HR patch 2h hr  nicotine (NICODERM CQ) 7 MG/24HR patch 2h hr  OMEPRAZOLE PO  order for DME  PAROXETINE HCL PO  pramipexole (MIRAPEX) 0.5 MG  tablet  predniSONE (DELTASONE) 20 MG tablet  QUEtiapine (SEROQUEL) 25 MG tablet  traZODone (DESYREL) 100 MG tablet        Surgical History   Past Surgical History:   Procedure Laterality Date    BREAST SURGERY      LAPAROSCOPIC GASTRIC SLEEVE      MASTECTOMY      TONSILLECTOMY           Physical Exam   Patient Vitals for the past 24 hrs:   BP Temp Pulse Resp SpO2 Weight   10/09/24 2111 (!) 138/97 98.1  F (36.7  C) 84 16 98 % 83.9 kg (185 lb)       Physical Exam  Vitals: reviewed by me  General: Pt seen on \[facility]\"", pleasant, cooperative, and alert to conversation  Eyes: Tracking well, clear conjunctiva BL  ENT: MMM, midline trachea.   Lungs: No tachypnea, no accessory muscle use. No respiratory distress.   CV: Rate as above  Abd: Soft, does have tenderness throughout the abdomen especially in the upper abdomen, but no guarding or rebound noted.  MSK: no joint effusion.  No evidence of trauma  Skin: No rash  Neuro: Clear speech and no facial droop.  Psych: Not RIS, no e/o AH/VH      Diagnostics   Lab Results   Labs Ordered and Resulted from Time of ED Arrival to Time of ED Departure   COMPREHENSIVE METABOLIC PANEL - Abnormal       Result Value    Sodium 142      Potassium 4.3      Carbon Dioxide (CO2) 26      Anion Gap 8      Urea Nitrogen 11.2      Creatinine 0.81      GFR Estimate 84      Calcium 9.5      Chloride 108 (*)     Glucose 141 (*)     Alkaline Phosphatase 69      AST 42      ALT 21      Protein Total 6.7      Albumin 4.0      Bilirubin Total 0.4     LIPASE - Normal    Lipase 24     LACTIC ACID WHOLE BLOOD WITH 1X REPEAT IN 2 HR WHEN >2 - Normal    Lactic Acid, Initial 1.3     CBC WITH PLATELETS AND DIFFERENTIAL    WBC Count 6.8      RBC Count 4.26      Hemoglobin 12.6      Hematocrit 37.1      MCV 87      MCH 29.6      MCHC 34.0      RDW 13.0      Platelet Count 218      % Neutrophils 50      % Lymphocytes 43      % Monocytes 5      % Eosinophils 1      % Basophils 0      % Immature Granulocytes 0       NRBCs per 100 WBC 0      Absolute Neutrophils 3.4      Absolute Lymphocytes 2.9      Absolute Monocytes 0.4      Absolute Eosinophils 0.1      Absolute Basophils 0.0      Absolute Immature Granulocytes 0.0      Absolute NRBCs 0.0     TYPE AND SCREEN, ADULT    ABO/RH(D) O POS      Antibody Screen Negative      SPECIMEN EXPIRATION DATE [id number]     ABO/RH TYPE AND SCREEN       Imaging  CT Abdomen Pelvis w Contrast   Final Result   IMPRESSION:    1.  Postoperative changes involving the stomach. No mechanical bowel obstruction, free gas, acute inflammatory changes or ongoing active extravasation. Normal appendix.      2.  Tiny nonobstructive stone in the lower pole of the right kidney. No opaque urinary tract obstruction. Hysterectomy. Numerous pelvic phleboliths.                  ED Course      Medications Administered   Medications   ondansetron (ZOFRAN) injection 4 mg (4 mg Intravenous $Given 10/9/24 2239)   morphine (PF) injection 6 mg (6 mg Intravenous $Given 10/9/24 2240)   iopamidol (ISOVUE-370) solution 93 mL (93 mLs Intravenous $Given 10/9/24 2312)   sodium chloride 0.9 % bag 500mL for CT scan flush use (67 mLs Intravenous $Given 10/9/24 2312)            Optional/Additional Documentation  None      Medical Decision Making / Diagnosis       MDM  This is a very pleasant 59-year-old female who presents to the emergency room with what appears to be diarrhea and abdominal pain.  It sounds like she had some normal loose stools to begin with and then began to have some blood in her stool, which may simply represent irritation.  Her CT scan does not show any obvious cause of her symptoms, and without any colitis or evidence of sepsis I do not think that antibiotics would necessarily be helpful.  Also importantly, patient has no travel history.  Her abdomen is benign at time of discharge, she is tolerating orals and feels significantly improved after medication here.  Will send her home with nausea medication  as well, and recommend over-the-counter meds for her symptoms.  Red flags for when to come back to the ER were discussed in detail, patient is okay with this plan and has good family support in the form of her son.    ICD-10 Codes:    ICD-10-CM    1. Abdominal pain, unspecified abdominal location  R10.9       2. Rectal bleeding  K62.5       3. Diarrhea, unspecified type  R19.7              Discharge Medications  Discharge Medication List as of 10/10/2024 12:19 AM        START taking these medications    Details   ondansetron (ZOFRAN ODT) 4 MG ODT tab Take 1 tablet (4 mg) by mouth every 8 hours as needed., Disp-10 tablet, R-0, Local Print                        Drake Tamayo MD  10/10/24 0155

## 2024-10-10 NOTE — ED TRIAGE NOTES
Pt having diarrhea with dark red blood since Sunday - about 4 episodes daily. Pt also reporting lower abdominal and rectal pain     Triage Assessment (Adult)       Row Name 10/09/24 2112          Respiratory WDL    Respiratory WDL WDL        Cardiac WDL    Cardiac WDL WDL        Cognitive/Neuro/Behavioral WDL    Cognitive/Neuro/Behavioral WDL WDL

## 2025-03-29 ENCOUNTER — HEALTH MAINTENANCE LETTER (OUTPATIENT)
Age: 60
End: 2025-03-29

## 2025-04-28 ENCOUNTER — HOSPITAL ENCOUNTER (OUTPATIENT)
Facility: CLINIC | Age: 60
Setting detail: OBSERVATION
Discharge: HOME | End: 2025-04-30
Attending: EMERGENCY MEDICINE | Admitting: EMERGENCY MEDICINE
Payer: COMMERCIAL

## 2025-04-28 DIAGNOSIS — F33.1 MAJOR DEPRESSIVE DISORDER, RECURRENT EPISODE, MODERATE (H): ICD-10-CM

## 2025-04-28 DIAGNOSIS — F99 INSOMNIA DUE TO OTHER MENTAL DISORDER: ICD-10-CM

## 2025-04-28 DIAGNOSIS — F10.20 ALCOHOL USE DISORDER, MODERATE, DEPENDENCE (H): ICD-10-CM

## 2025-04-28 DIAGNOSIS — F51.05 INSOMNIA DUE TO OTHER MENTAL DISORDER: ICD-10-CM

## 2025-04-28 DIAGNOSIS — E66.9 OBESITY (BMI 30-39.9): ICD-10-CM

## 2025-04-28 PROBLEM — F41.1 GAD (GENERALIZED ANXIETY DISORDER): Status: ACTIVE | Noted: 2025-04-28

## 2025-04-28 PROBLEM — F19.90 SUBSTANCE USE DISORDER: Status: ACTIVE | Noted: 2025-04-28

## 2025-04-28 PROBLEM — F32.9 MDD (MAJOR DEPRESSIVE DISORDER): Status: ACTIVE | Noted: 2025-04-28

## 2025-04-28 LAB
ALBUMIN SERPL BCG-MCNC: 4.1 G/DL (ref 3.5–5.2)
ALP SERPL-CCNC: 74 U/L (ref 40–150)
ALT SERPL W P-5'-P-CCNC: 18 U/L (ref 0–50)
ANION GAP SERPL CALCULATED.3IONS-SCNC: 13 MMOL/L (ref 7–15)
AST SERPL W P-5'-P-CCNC: 31 U/L (ref 0–45)
ATRIAL RATE - MUSE: 73 BPM
BASOPHILS # BLD AUTO: 0 10E3/UL (ref 0–0.2)
BASOPHILS NFR BLD AUTO: 1 %
BILIRUB SERPL-MCNC: 1.2 MG/DL
BUN SERPL-MCNC: 13.2 MG/DL (ref 8–23)
CALCIUM SERPL-MCNC: 9.8 MG/DL (ref 8.8–10.4)
CHLORIDE SERPL-SCNC: 100 MMOL/L (ref 98–107)
CREAT SERPL-MCNC: 0.75 MG/DL (ref 0.51–0.95)
DIASTOLIC BLOOD PRESSURE - MUSE: NORMAL MMHG
EGFRCR SERPLBLD CKD-EPI 2021: >90 ML/MIN/1.73M2
EOSINOPHIL # BLD AUTO: 0 10E3/UL (ref 0–0.7)
EOSINOPHIL NFR BLD AUTO: 1 %
ERYTHROCYTE [DISTWIDTH] IN BLOOD BY AUTOMATED COUNT: 13.2 % (ref 10–15)
GLUCOSE SERPL-MCNC: 104 MG/DL (ref 70–99)
HCO3 SERPL-SCNC: 22 MMOL/L (ref 22–29)
HCT VFR BLD AUTO: 36.9 % (ref 35–47)
HGB BLD-MCNC: 12.8 G/DL (ref 11.7–15.7)
HOLD SPECIMEN: NORMAL
HOLD SPECIMEN: NORMAL
IMM GRANULOCYTES # BLD: 0 10E3/UL
IMM GRANULOCYTES NFR BLD: 0 %
INTERPRETATION ECG - MUSE: NORMAL
LYMPHOCYTES # BLD AUTO: 1.5 10E3/UL (ref 0.8–5.3)
LYMPHOCYTES NFR BLD AUTO: 31 %
MAGNESIUM SERPL-MCNC: 1.7 MG/DL (ref 1.7–2.3)
MCH RBC QN AUTO: 31.6 PG (ref 26.5–33)
MCHC RBC AUTO-ENTMCNC: 34.7 G/DL (ref 31.5–36.5)
MCV RBC AUTO: 91 FL (ref 78–100)
MONOCYTES # BLD AUTO: 0.4 10E3/UL (ref 0–1.3)
MONOCYTES NFR BLD AUTO: 8 %
NEUTROPHILS # BLD AUTO: 2.8 10E3/UL (ref 1.6–8.3)
NEUTROPHILS NFR BLD AUTO: 59 %
NRBC # BLD AUTO: 0 10E3/UL
NRBC BLD AUTO-RTO: 0 /100
P AXIS - MUSE: 16 DEGREES
PLATELET # BLD AUTO: 202 10E3/UL (ref 150–450)
POTASSIUM SERPL-SCNC: 3.9 MMOL/L (ref 3.4–5.3)
PR INTERVAL - MUSE: 164 MS
PROT SERPL-MCNC: 7 G/DL (ref 6.4–8.3)
QRS DURATION - MUSE: 86 MS
QT - MUSE: 402 MS
QTC - MUSE: 442 MS
R AXIS - MUSE: -22 DEGREES
RBC # BLD AUTO: 4.05 10E6/UL (ref 3.8–5.2)
SODIUM SERPL-SCNC: 135 MMOL/L (ref 135–145)
SYSTOLIC BLOOD PRESSURE - MUSE: NORMAL MMHG
T AXIS - MUSE: 31 DEGREES
TROPONIN T SERPL HS-MCNC: <6 NG/L
VENTRICULAR RATE- MUSE: 73 BPM
WBC # BLD AUTO: 4.7 10E3/UL (ref 4–11)

## 2025-04-28 PROCEDURE — 99222 1ST HOSP IP/OBS MODERATE 55: CPT | Performed by: PSYCHIATRY & NEUROLOGY

## 2025-04-28 PROCEDURE — G0378 HOSPITAL OBSERVATION PER HR: HCPCS

## 2025-04-28 PROCEDURE — 99285 EMERGENCY DEPT VISIT HI MDM: CPT | Mod: 25

## 2025-04-28 PROCEDURE — 96374 THER/PROPH/DIAG INJ IV PUSH: CPT

## 2025-04-28 PROCEDURE — 83735 ASSAY OF MAGNESIUM: CPT | Performed by: EMERGENCY MEDICINE

## 2025-04-28 PROCEDURE — 82565 ASSAY OF CREATININE: CPT | Performed by: EMERGENCY MEDICINE

## 2025-04-28 PROCEDURE — 85025 COMPLETE CBC W/AUTO DIFF WBC: CPT | Performed by: EMERGENCY MEDICINE

## 2025-04-28 PROCEDURE — 84484 ASSAY OF TROPONIN QUANT: CPT | Performed by: EMERGENCY MEDICINE

## 2025-04-28 PROCEDURE — 93005 ELECTROCARDIOGRAM TRACING: CPT

## 2025-04-28 PROCEDURE — 250N000011 HC RX IP 250 OP 636: Performed by: EMERGENCY MEDICINE

## 2025-04-28 PROCEDURE — 36415 COLL VENOUS BLD VENIPUNCTURE: CPT | Performed by: EMERGENCY MEDICINE

## 2025-04-28 PROCEDURE — 250N000013 HC RX MED GY IP 250 OP 250 PS 637: Performed by: PSYCHIATRY & NEUROLOGY

## 2025-04-28 RX ORDER — PAROXETINE 10 MG/1
30 TABLET, FILM COATED ORAL DAILY
Status: DISCONTINUED | OUTPATIENT
Start: 2025-04-28 | End: 2025-04-30 | Stop reason: HOSPADM

## 2025-04-28 RX ORDER — FUROSEMIDE 20 MG/1
20 TABLET ORAL DAILY
Status: DISCONTINUED | OUTPATIENT
Start: 2025-04-28 | End: 2025-04-30 | Stop reason: HOSPADM

## 2025-04-28 RX ORDER — LORAZEPAM 1 MG/1
1-2 TABLET ORAL EVERY 30 MIN PRN
Status: DISCONTINUED | OUTPATIENT
Start: 2025-04-28 | End: 2025-04-30 | Stop reason: HOSPADM

## 2025-04-28 RX ORDER — CLONIDINE HYDROCHLORIDE 0.1 MG/1
0.1 TABLET ORAL EVERY 8 HOURS
Status: DISCONTINUED | OUTPATIENT
Start: 2025-04-28 | End: 2025-04-30 | Stop reason: HOSPADM

## 2025-04-28 RX ORDER — HYDROXYZINE HYDROCHLORIDE 25 MG/1
25 TABLET, FILM COATED ORAL 3 TIMES DAILY PRN
Status: DISCONTINUED | OUTPATIENT
Start: 2025-04-28 | End: 2025-04-30 | Stop reason: HOSPADM

## 2025-04-28 RX ORDER — ONDANSETRON 4 MG/1
4 TABLET, ORALLY DISINTEGRATING ORAL EVERY 6 HOURS PRN
Status: DISCONTINUED | OUTPATIENT
Start: 2025-04-28 | End: 2025-04-30 | Stop reason: HOSPADM

## 2025-04-28 RX ORDER — NALTREXONE HYDROCHLORIDE 50 MG/1
50 TABLET, FILM COATED ORAL DAILY
COMMUNITY
Start: 2025-03-09

## 2025-04-28 RX ORDER — GABAPENTIN 100 MG/1
100 CAPSULE ORAL 2 TIMES DAILY
COMMUNITY
Start: 2025-03-09

## 2025-04-28 RX ORDER — MULTIPLE VITAMINS W/ MINERALS TAB 9MG-400MCG
1 TAB ORAL DAILY
Status: DISCONTINUED | OUTPATIENT
Start: 2025-04-28 | End: 2025-04-30 | Stop reason: HOSPADM

## 2025-04-28 RX ORDER — HYDROXYZINE HYDROCHLORIDE 25 MG/1
25 TABLET, FILM COATED ORAL 3 TIMES DAILY PRN
COMMUNITY
End: 2025-04-30

## 2025-04-28 RX ORDER — BUPROPION HYDROCHLORIDE 300 MG/1
300 TABLET ORAL DAILY
Status: DISCONTINUED | OUTPATIENT
Start: 2025-04-29 | End: 2025-04-30 | Stop reason: HOSPADM

## 2025-04-28 RX ORDER — TRAZODONE HYDROCHLORIDE 100 MG/1
200 TABLET ORAL AT BEDTIME
Status: DISCONTINUED | OUTPATIENT
Start: 2025-04-28 | End: 2025-04-30 | Stop reason: HOSPADM

## 2025-04-28 RX ORDER — HALOPERIDOL 5 MG/ML
2.5-5 INJECTION INTRAMUSCULAR EVERY 6 HOURS PRN
Status: DISCONTINUED | OUTPATIENT
Start: 2025-04-28 | End: 2025-04-30 | Stop reason: HOSPADM

## 2025-04-28 RX ORDER — PAROXETINE 10 MG/1
20 TABLET, FILM COATED ORAL DAILY
Status: DISCONTINUED | OUTPATIENT
Start: 2025-04-28 | End: 2025-04-28

## 2025-04-28 RX ORDER — GABAPENTIN 300 MG/1
300 CAPSULE ORAL 3 TIMES DAILY
Status: DISCONTINUED | OUTPATIENT
Start: 2025-04-28 | End: 2025-04-30 | Stop reason: HOSPADM

## 2025-04-28 RX ORDER — QUETIAPINE FUMARATE 25 MG/1
25 TABLET, FILM COATED ORAL AT BEDTIME
Status: DISCONTINUED | OUTPATIENT
Start: 2025-04-28 | End: 2025-04-30 | Stop reason: HOSPADM

## 2025-04-28 RX ORDER — LISINOPRIL 10 MG/1
10 TABLET ORAL DAILY
Status: DISCONTINUED | OUTPATIENT
Start: 2025-04-28 | End: 2025-04-30 | Stop reason: HOSPADM

## 2025-04-28 RX ORDER — BUPROPION HYDROCHLORIDE 300 MG/1
300 TABLET ORAL DAILY
COMMUNITY

## 2025-04-28 RX ORDER — LORAZEPAM 2 MG/ML
1-2 INJECTION INTRAMUSCULAR EVERY 30 MIN PRN
Status: DISCONTINUED | OUTPATIENT
Start: 2025-04-28 | End: 2025-04-30 | Stop reason: HOSPADM

## 2025-04-28 RX ORDER — FLUMAZENIL 0.1 MG/ML
0.2 INJECTION, SOLUTION INTRAVENOUS
Status: DISCONTINUED | OUTPATIENT
Start: 2025-04-28 | End: 2025-04-30 | Stop reason: HOSPADM

## 2025-04-28 RX ORDER — OLANZAPINE 5 MG/1
5-10 TABLET, ORALLY DISINTEGRATING ORAL EVERY 6 HOURS PRN
Status: DISCONTINUED | OUTPATIENT
Start: 2025-04-28 | End: 2025-04-30 | Stop reason: HOSPADM

## 2025-04-28 RX ORDER — LORAZEPAM 2 MG/ML
1 INJECTION INTRAMUSCULAR ONCE
Status: COMPLETED | OUTPATIENT
Start: 2025-04-28 | End: 2025-04-28

## 2025-04-28 RX ADMIN — GABAPENTIN 300 MG: 300 CAPSULE ORAL at 21:36

## 2025-04-28 RX ADMIN — PAROXETINE HYDROCHLORIDE 30 MG: 10 TABLET, FILM COATED ORAL at 16:32

## 2025-04-28 RX ADMIN — TRAZODONE HYDROCHLORIDE 200 MG: 100 TABLET ORAL at 21:36

## 2025-04-28 RX ADMIN — FUROSEMIDE 20 MG: 20 TABLET ORAL at 16:32

## 2025-04-28 RX ADMIN — GABAPENTIN 300 MG: 300 CAPSULE ORAL at 16:32

## 2025-04-28 RX ADMIN — LISINOPRIL 10 MG: 10 TABLET ORAL at 16:32

## 2025-04-28 RX ADMIN — LORAZEPAM 1 MG: 1 TABLET ORAL at 12:53

## 2025-04-28 RX ADMIN — CLONIDINE HYDROCHLORIDE 0.1 MG: 0.1 TABLET ORAL at 12:53

## 2025-04-28 RX ADMIN — CLONIDINE HYDROCHLORIDE 0.1 MG: 0.1 TABLET ORAL at 21:37

## 2025-04-28 RX ADMIN — LORAZEPAM 1 MG: 2 INJECTION INTRAMUSCULAR; INTRAVENOUS at 07:02

## 2025-04-28 RX ADMIN — Medication 1 TABLET: at 12:54

## 2025-04-28 RX ADMIN — QUETIAPINE FUMARATE 25 MG: 25 TABLET ORAL at 21:36

## 2025-04-28 ASSESSMENT — LIFESTYLE VARIABLES
ORIENTATION AND CLOUDING OF SENSORIUM: ORIENTED AND CAN DO SERIAL ADDITIONS
AGITATION: NORMAL ACTIVITY
AUDITORY DISTURBANCES: NOT PRESENT
VISUAL DISTURBANCES: NOT PRESENT
AUDITORY DISTURBANCES: NOT PRESENT
PAROXYSMAL SWEATS: NO SWEAT VISIBLE
AGITATION: NORMAL ACTIVITY
PAROXYSMAL SWEATS: NO SWEAT VISIBLE
PAROXYSMAL SWEATS: BARELY PERCEPTIBLE SWEATING, PALMS MOIST
ANXIETY: 3
HEADACHE, FULLNESS IN HEAD: NOT PRESENT
TOTAL SCORE: 8
HEADACHE, FULLNESS IN HEAD: NOT PRESENT
NAUSEA AND VOMITING: NO NAUSEA AND NO VOMITING
NAUSEA AND VOMITING: NO NAUSEA AND NO VOMITING
NAUSEA AND VOMITING: MILD NAUSEA WITH NO VOMITING
TOTAL SCORE: 2
HEADACHE, FULLNESS IN HEAD: VERY MILD
AGITATION: NORMAL ACTIVITY
TOTAL SCORE: 10
VISUAL DISTURBANCES: NOT PRESENT
ORIENTATION AND CLOUDING OF SENSORIUM: ORIENTED AND CAN DO SERIAL ADDITIONS
AGITATION: NORMAL ACTIVITY
NAUSEA AND VOMITING: MILD NAUSEA WITH NO VOMITING
ORIENTATION AND CLOUDING OF SENSORIUM: ORIENTED AND CAN DO SERIAL ADDITIONS
NAUSEA AND VOMITING: NO NAUSEA AND NO VOMITING
ANXIETY: MILDLY ANXIOUS
VISUAL DISTURBANCES: NOT PRESENT
AUDITORY DISTURBANCES: NOT PRESENT
AGITATION: NORMAL ACTIVITY
ORIENTATION AND CLOUDING OF SENSORIUM: ORIENTED AND CAN DO SERIAL ADDITIONS
HEADACHE, FULLNESS IN HEAD: NOT PRESENT
TOTAL SCORE: 3
HEADACHE, FULLNESS IN HEAD: NOT PRESENT
ORIENTATION AND CLOUDING OF SENSORIUM: ORIENTED AND CAN DO SERIAL ADDITIONS
ORIENTATION AND CLOUDING OF SENSORIUM: ORIENTED AND CAN DO SERIAL ADDITIONS
TREMOR: NO TREMOR
TREMOR: MODERATE, WITH PATIENT'S ARMS EXTENDED
ANXIETY: MILDLY ANXIOUS
TACTILE DISTURBANCES: VERY MILD ITCHING, PINS AND NEEDLES, BURNING OR NUMBNESS
PAROXYSMAL SWEATS: BARELY PERCEPTIBLE SWEATING, PALMS MOIST
AGITATION: NORMAL ACTIVITY
VISUAL DISTURBANCES: NOT PRESENT
TREMOR: 2
TOTAL SCORE: 1
TREMOR: NO TREMOR
VISUAL DISTURBANCES: NOT PRESENT
ANXIETY: MILDLY ANXIOUS
AUDITORY DISTURBANCES: NOT PRESENT
PAROXYSMAL SWEATS: 2
PAROXYSMAL SWEATS: NO SWEAT VISIBLE
TREMOR: NO TREMOR
NAUSEA AND VOMITING: NO NAUSEA AND NO VOMITING
AUDITORY DISTURBANCES: NOT PRESENT
AUDITORY DISTURBANCES: NOT PRESENT
ANXIETY: MODERATELY ANXIOUS, OR GUARDED, SO ANXIETY IS INFERRED

## 2025-04-28 ASSESSMENT — COLUMBIA-SUICIDE SEVERITY RATING SCALE - C-SSRS
1. IN THE PAST MONTH, HAVE YOU WISHED YOU WERE DEAD OR WISHED YOU COULD GO TO SLEEP AND NOT WAKE UP?: NO
2. HAVE YOU ACTUALLY HAD ANY THOUGHTS OF KILLING YOURSELF IN THE PAST MONTH?: NO
6. HAVE YOU EVER DONE ANYTHING, STARTED TO DO ANYTHING, OR PREPARED TO DO ANYTHING TO END YOUR LIFE?: NO

## 2025-04-28 ASSESSMENT — ACTIVITIES OF DAILY LIVING (ADL)
ADLS_ACUITY_SCORE: 41

## 2025-04-28 NOTE — Clinical Note
Nicolle Naidu was seen and treated in our emergency department on 4/28/2025.  She may return to work on 05/05/2025.       If you have any questions or concerns, please don't hesitate to call.      Kami Schroeder, RUPINDER CNP

## 2025-04-28 NOTE — ED NOTES
Pt transferred from ED 24. She is pleasant cooperative and has been at [facility] before . She states  her   6 month ago and she has been drinking about 1/2 a liter a day and has been dealing with more anxiety and depression lately. Her BP is high and has fine tremors. Will talk to MD about CIWA. Pt has fleeting SI, no plan

## 2025-04-28 NOTE — ED NOTES
60 year old female with history of depression received from ED due to ETOH withdrawls. Reports fleeting. SI. Nursing and risk assessments completed. Assessments reviewed with LMHP and physician. Admission information reviewed with patient. Patient given a tour of [facility] and instructions on using the facility. Questions regarding [facility] addressed. Pt safety search completed.

## 2025-04-28 NOTE — ED NOTES
[facility]  ED to [facility] Checklist:      Goal for [facility]: Depression management, Substance use, and Time to stabilize    Current Behavior: Calm and Cooperative    Safety Concerns: None    Legal Hold Status: Voluntary    Medically Cleared by ED provider: Blood glucose checked , Vital signs stable, IV removed, and Able to manage symptoms with PO medications-- withdrawing     Patient Therapeutically Searched: Therapeutic search by ED staff (strings, belts, shoes, pockets, electronics, etc.)    Belongings: Remain with patient    Independent Ambulation at Baseline: Yes/No: Yes    Participates in Care/Conversation: Yes/No: Yes    Patient Informed about [facility]: Yes/No: Yes    DEC: Ordered and pending    Patient Ready to be Transferred to [facility]? Yes/No: Yes

## 2025-04-28 NOTE — ED PROVIDER NOTES
"  Emergency Department Note      History of Present Illness     Chief Complaint   Hypertension and Depression      HPI   Nicolle Naidu is a 60 year old female with a history of hypertension, type 2 diabetes, and hypercholesterolemia here for evaluation of hypertension and depression. The patient is a recovering alcoholic who started drinking again 7-8 months ago. She reports that she has had a lot of stress in her life recently with the passing of her  and her son's legal trouble. She drinks around 5 drinks every night, and she goes through 1 liter of vodka in a couple days. Her last drink was on Saturday. She feels like she needs help and is having trouble keeping herself together for work. She works from home as a . She does not want to go into her office and stays inside all the time because she does not want to be around people. She has also recently fell and has trouble sleeping due to running out of trazodone. The patient reports having thoughts of self harm, but has not attempted and has no plan. She has some shakes and sweating, but no withdrawal seizures. She was admitted here for 3 days for detox before going into rehab in 2022. She is on naltrexone, bupropion, and paroxetine. She was also prescribed hydroxyzine, but has not been taking it for a while. She has a therapist, but she has not seen them in the last 3 months. She started smoking again recently.    She also reports onset of chest and neck pressure, and a \"weird feeling\" in her back and shoulders 1 month ago. She started taking her blood pressure at home, which showed higher numbers in the last couple of days. She denies abdominal pain. She had hypertension in the past, but reports that she has a gastric surgery that made it go away. She also reports having prediabetes that went away after there gastric surgery. Her grandfather has a history of heart disease and her mother has an arrhyhtmia. She has not had a " "stress test before.    Independent Historian   None    Review of External Notes   I reviewed Care Everywhere and updated Epic.    Past Medical History     Medical History and Problem List   Hypertension  Hypercholesterolemia  Gastroesophageal reflux disease  Obstructive sleep apnea  Adenoid hypertrophy  Thoracic aortic ectasia  Sinusitis  Type 2 diabetes    Medications   Catapres  Neurontin  Desyrel  Lasix  Zestril  Naltrexone  Bupropion  Paroxetine    Surgical History   Mastectomy  Laparoscopic gastric sleeve  Tonsillectomy and adenoidectomy  Breast reconstruction on right side  Hysterectomy  Tubal ligation    Physical Exam     Patient Vitals for the past 24 hrs:   BP Temp Temp src Pulse Resp SpO2 Height   04/28/25 0621 (!) 165/108 97  F (36.1  C) Temporal 74 18 97 % 1.626 m (5' 4\")     Physical Exam  GENERAL: well developed, pleasant  HEAD: atraumatic  EYES: pupils reactive, extraocular muscles intact, conjunctivae normal  ENT:  mucus membranes moist  NECK:  trachea midline, normal range of motion  RESPIRATORY: no tachypnea, breath sounds clear to auscultation   CVS: normal S1/S2, no murmurs, intact distal pulses  ABDOMEN: soft, nontender, nondistention  MUSCULOSKELETAL: no deformities  SKIN: warm and dry, no acute rashes or ulceration  NEURO: GCS 15, cranial nerves intact, alert and oriented x3  PSYCH:  Mood/affect normal     Diagnostics     Lab Results   Labs Ordered and Resulted from Time of ED Arrival to Time of ED Departure - No data to display    Imaging   No orders to display       EKG   ECG taken at 0633, ECG read at 0640  Normal sinus rhythm  Normal ECG  Rate 73 bpm. UT interval 164 ms. QRS duration 86 ms. QT/QTc 402/442 ms. P-R-T axes 16 -22 31.    Independent Interpretation   None    ED Course      Medications Administered   Medications - No data to display    Procedures   Procedures     Discussion of Management   Fabiano DOYLE    ED Course   ED Course as of 04/28/25 0945   Mon Apr 28, 2025   0640 I " obtained history and examined the patient as noted above.   3640 I discussed the patient's presentation with Fabiano from DEC. Will talk to patient about transfer to [facility].       Additional Documentation  None    Medical Decision Making / Diagnosis     CMS Diagnoses: None    MIPS       None    MDM   Nicolle Naidu is a 60 year old female ***    Disposition   The patient was transferred to [facility].     Diagnosis   No diagnosis found.     Discharge Medications   New Prescriptions    No medications on file         Scribe Disclosure:  I, Ángel Ortega, am serving as a scribe at 6:34 AM on 4/28/2025 to document services personally performed by Price Fortune MD based on my observations and the provider's statements to me.      going care.    Disposition   The patient was transferred to [facility].     Diagnosis     ICD-10-CM    1. Insomnia due to other mental disorder  F51.05     F99       2. Major depressive disorder, recurrent episode, moderate (H)  F33.1       3. Alcohol use disorder, moderate, dependence (H)  F10.20       4. Obesity (BMI 30-39.9)  E66.9 PARoxetine (PAXIL) 30 MG tablet     DISCONTINUED: PARoxetine (PAXIL) 30 MG tablet           Discharge Medications   Discharge Medication List as of 4/30/2025  2:39 PM            Scribe Disclosure:  Ángel GREGORY, am serving as a scribe at 6:34 AM on 4/28/2025 to document services personally performed by Price Fortune MD based on my observations and the provider's statements to me.        Price Fortune MD  05/14/25 1375

## 2025-04-28 NOTE — CONSULTS
"Diagnostic Evaluation Consultation  Crisis Assessment    Patient Name: Nicolle Naidu  Age:  60 year old  Legal Sex: female  Gender Identity: female  Pronouns:      Race: White  Ethnicity: Not  or   Language: English      Patient was assessed: In person   Crisis Assessment Start Date: 04/28/25  Crisis Assessment Start Time: 0815  Crisis Assessment Stop Time: 0915  Patient location: [facility] Emergency [facility]                             [id number]    Referral Data and Chief Complaint  Nicolle Naidu presents to the ED by  self. Patient is presenting to the ED for the following concerns: Suicidal ideation, Worsening psychosocial stress, Substance use, Other (see comment), Depression, Anxiety (physical discomfort). Factors that make the mental health crisis life threatening or complex are: Pt presents to the ED initially for concern for physical complaints such as chest pain and blood pressure concerns. Pt reports she has relapsed on alcohol about 6-8 months ago and has been using daily and it has started to impact her ability to complete work tasks. Pt reports her last drink was on Saturday, 04/26/2025, and has been attempting to manage her withdrawal symptoms at home such as shakiness, sweating, and feeling physically unwell. Pt reports she decided she needed medical attention this morning due to continued symptoms and having utilized \"detox\" in the past to overcome her symptoms after stopping drinking. Pt also identifies recent suicidal ideation and identified a method of crashing her car, yet denies any plan or intent and denies ever attempting to complete suicide. Pt denies any HI, AH/VH currently, yet reports she will hear sounds when she is coming alcohol, which Pt believes may be attributed to her high blood pressure. Pt reports feeling stuck in a cycle of high anxiety and depressive symptoms which impacts her desire to drink. Pt reports she is working with a medication provider " through [facility]. Pt is identifying desire to remain in the hospital for detox. Ultimately, Pt was agreeable to transfer to [facility] to meet with the attending psychiatric provider to discuss PRN anxiety medication.      Informed Consent and Assessment Methods  Explained the crisis assessment process, including applicable information disclosures and limits to confidentiality, assessed understanding of the process, and obtained consent to proceed with the assessment.  Assessment methods included conducting a formal interview with patient, review of medical records, collaboration with medical staff, and obtaining relevant collateral information from family and community providers when available.  : done     History of the Crisis   Pt reports historical diagnoses of MDD and RUDY. Pt denies any history of mental health inpatient hospitalization, IOP, or PHP, yet reports completing residential treatment and a step-down program through [facility] for substance use in 2022. Pt reports living at home with her 2 adult sons.    Brief Psychosocial History  Family:  , Children yes  Support System:  Children  Employment Status:  employed full-time  Source of Income:  salary/wages  Financial Environmental Concerns:  none  Current Hobbies:  television/movies/videos, group/social activities, interaction with pets, family functions, social media/computer activities, reading, music, cooking/baking  Barriers in Personal Life:  mental health concerns    Significant Clinical History  Current Anxiety Symptoms:  anxious  Current Depression/Trauma:  thoughts of death/suicide, sadness, hopelessness, withdrawl/isolation, crying or feels like crying, low self esteem  Current Somatic Symptoms:  somatic symptoms (abdominal pain, headache, tension), sweating, flushing, shaking  Current Psychosis/Thought Disturbance:     Current Eating Symptoms:     Chemical Use History:  Alcohol: Daily  Last Use:: 04/26/25  Benzodiazepines:  None  Opiates: None  Marijuana: None  Other Use: None  Withdrawal Symptoms: Tremors   Past diagnosis:  Anxiety Disorder, Depression, Substance Use Disorder  Family history:  No known history of mental health or chemical health concerns  Past treatment:  Individual therapy, Psychiatric Medication Management, Residential Treatment, Day Treatment  Details of most recent treatment:  Currently working with a provider through [facility] for medication management.  Other relevant history:  History of individual therapy, [facility] residential and step-down program.    Have there been any medication changes in the past two weeks:  no       Is the patient compliant with medications:  yes        Collateral Information  Is there collateral information: No (Writer attempted to collect collateral from emergency contact. Phone number went to a busy tone and was unable to connect.)        Risk Assessment  [place] Suicide Severity Rating Scale Full Clinical Version:  Suicidal Ideation  Q6 Suicide Behavior (Lifetime): no          [place] Suicide Severity Rating Scale Recent:   Suicidal Ideation (Recent)  Q1 Wished to be Dead (Past Month): yes  Q2 Suicidal Thoughts (Past Month): yes  Q3 Suicidal Thought Method: yes  Q4 Suicidal Intent without Specific Plan: no  Q5 Suicide Intent with Specific Plan: no  Level of Risk per Screen: moderate risk  Intensity of Ideation (Recent)  Most Severe Ideation Rating (Past 1 Month): 3  Frequency (Past 1 Month): Daily or almost daily  Duration (Past 1 Month): Fleeting, few seconds or minutes  Controllability (Past 1 Month): Easily able to control thoughts  Deterrents (Past 1 Month): Deterrents definitely stopped you from attempting suicide  Reasons for Ideation (Past 1 Month): Completely to end or stop the pain (You couldn't go on living with the pain or how you were feeling)  Suicidal Behavior (Recent)  Actual Attempt (Past 3 Months): No  Has subject engaged in non-suicidal self-injurious  "behavior? (Past 3 Months): No  Interrupted Attempts (Past 3 Months): No  Aborted or Self-Interrupted Attempt (Past 3 Months): No  Preparatory Acts or Behavior (Past 3 Months): No    Environmental or Psychosocial Events: helplessness/hopelessness, work or task failure  Protective Factors: Protective Factors: strong bond to family unit, community support, or employment, responsibilities and duties to others, including pets and children, lives in a responsibly safe and stable environment, help seeking, optimistic outlook - identification of future goals    Does the patient have thoughts of harming others? Feels Like Hurting Others: no  Previous Attempt to Hurt Others: no  Is the patient engaging in sexually inappropriate behavior?: no  Does Patient have a known history of aggressive behavior: No    Is the patient engaging in sexually inappropriate behavior?  no        Mental Status Exam   Affect: Appropriate  Appearance: Appropriate  Attention Span/Concentration: Attentive  Eye Contact: Engaged    Fund of Knowledge: Appropriate   Language /Speech Content: Fluent  Language /Speech Volume: Normal  Language /Speech Rate/Productions: Normal  Recent Memory: Intact  Remote Memory: Intact  Mood: Anxious, Sad  Orientation to Person: Yes   Orientation to Place: Yes  Orientation to Time of Day: Yes  Orientation to Date: Yes     Situation (Do they understand why they are here?): Yes  Psychomotor Behavior: Normal  Thought Content: Clear, Suicidal, Other (please comment) (endorses suicidal ideation with identified method of possibly crashing car, contracts for safety, denies any intent.)  Thought Form: Intact        Medication  Psychotropic medications:   Medication Orders - Psychiatric (From admission, onward)      Start     Dose/Rate Route Frequency Ordered Stop    04/28/25 1209  LORazepam (ATIVAN) tablet 1-2 mg        Placed in \"Or\" Linked Group    1-2 mg Oral EVERY 30 MIN PRN 04/28/25 1210      04/28/25 1209  LORazepam (ATIVAN) " "injection 1-2 mg        Placed in \"Or\" Linked Group    1-2 mg Intravenous EVERY 30 MIN PRN 04/28/25 1210      04/28/25 1208  OLANZapine zydis (zyPREXA) ODT tab 5-10 mg        Placed in \"Or\" Linked Group    5-10 mg Oral EVERY 6 HOURS PRN 04/28/25 1210      04/28/25 1208  haloperidol lactate (HALDOL) injection 2.5-5 mg        Placed in \"Or\" Linked Group    2.5-5 mg  over 1 Minutes Intravenous EVERY 6 HOURS PRN 04/28/25 1210               Current Care Team  Patient Care Team:  No Ref-Primary, Physician as PCP - General    Diagnosis  Patient Active Problem List   Diagnosis Code    History of breast cancer Z85.3    Obesity (BMI 30-39.9) E66.9    Snoring R06.83    Diabetes type 2, controlled (H) E11.9    Essential hypertension I10    Pure hypercholesterolemia E78.00    Bilateral low back pain without sciatica M54.50    Gastroesophageal reflux disease with esophagitis K21.00    Tobacco use disorder F17.200    JUN (obstructive sleep apnea) G47.33    MDD (major depressive disorder) F32.9    RUDY (generalized anxiety disorder) F41.1    Substance use disorder F19.90       Primary Problem This Admission  Active Hospital Problems    MDD (major depressive disorder)      RUDY (generalized anxiety disorder)      Substance use disorder        Clinical Summary and Substantiation of Recommendations   Clinical Substantiation:  Pt presents to the ED initially for concern for physical complaints such as chest pain and blood pressure concerns. Pt reports she has relapsed on alcohol about 6-8 months ago and has been using daily and it has started to impact her ability to complete work tasks. Pt reports her last drink was on Saturday, 04/26/2025, and has been attempting to manage her withdrawal symptoms at home such as shakiness, sweating, and feeling physically unwell. Pt reports she decided she needed medical attention this morning due to continued symptoms and having utilized \"detox\" in the past to overcome her symptoms after stopping " drinking. Pt also identifies recent suicidal ideation and identified a method of crashing her car, yet denies any plan or intent and denies ever attempting to complete suicide. Pt denies any HI, AH/VH currently, yet reports she will hear sounds when she is coming alcohol, which Pt believes may be attributed to her high blood pressure. Pt reports feeling stuck in a cycle of high anxiety and depressive symptoms which impacts her desire to drink. Pt reports she is working with a medication provider through [facility]. Pt is identifying desire to remain in the hospital for detox. Ultimately, Pt was agreeable to transfer to [facility] to meet with the attending psychiatric provider to discuss PRN anxiety medication.        At this time it does appear that Pt would benefit from further observation and psychiatric stabilization via medication management and ED level therapeutic interventions, due to continued suicidal ideation and wanting to detox safety from her alcohol use. Pt does appear to be at higher risk of death by suicide accidental or intentional due to mental health history and substance use history. If Pt's symptoms improve it would be beneficial to pursue a less restrictive alternative.         Recommendation of therapeutic check-in on 04/29/2025 to assess for severity of suicidal ideation and withdrawal symptoms.    Goals for crisis stabilization:  Transfer to [facility]    Next steps for Care Team:  Meet with attending psychiatric provider to discuss medication, medical oversight on withdrawal symptoms    Treatment Objectives Addressed:  rapport building, identifying an appropriate aftercare plan, safety planning, assessing safety, identifying treatment goals, identifying additional supports, Lethal means counseling    Therapeutic Interventions:  Engaged in safety planning, Engaged in guided discovery, explored patient's perspectives and helped expand them through socratic dialogue., Engaged in social  skills training.    Has a specific means been identified for suicidal/homicide actions: No (Pt identifies having thoughts of how she could crash her car, yet denies any plan or intent. Pt denies any history of suicide attempts.)    If yes, describe:       Explain action steps toward mitigation:       Document completion of mitigation actions:       The follow up action still needed prior to discharge:       Patient coping skills attempted to reduce the crisis:  Voluntarily presented to the ED.    Disposition  Recommended referrals: Medication Management, Individual Therapy        Reviewed case and recommendations with attending provider. Attending Name: Dr. Fortune       Attending concurs with disposition: yes       Patient and/or validated legal guardian concurs with disposition:   yes       Final disposition:  observation                            Legal status: Voluntary/Patient has signed consent for treatment                                                                                                                                 Reviewed court records: yes       Assessment Details   Total duration spent with the patient: 60 min     CPT code(s) utilized: 91836 - Psychotherapy for Crisis - 60 (30-74*) min    Fabiano Cloud [facility], Psychotherapist  [facility] - Triage & Transition Services  Callback: [phone]

## 2025-04-28 NOTE — ED PROVIDER NOTES
EmPATH Unit - Initial Psychiatric Observation Note  [facility] Emergency Department  Observation Initiation Date: Apr 28, 2025    Nicolle Naidu MRN: [id number]   Age: 60 year old YOB: 1965     History     Chief Complaint   Patient presents with    Hypertension    Depression     HPI  Nicolle Naidu is a 60 year old female with a past history notable for major depressive disorder, anxiety, further complicated by an alcohol use disorder.  She presents to the emergency department reporting worsening depressed mood in the midst of increased alcohol use.  She had reported attempting to detox herself from alcohol a couple days ago which was not tolerable, leading to her arrival to the emergency room for additional help.  She was determined to be medically stable and transferred to the EmPATH unit for psychiatric assessment.  She is now approaching 9 hours in the emergency department.  On examination, the patient reviews with me that over the past 6+ months, her usage of alcohol has gradually increased beyond what she would like.  She has noticed that her time is consumed by alcohol which has impacted the time she would like to spend with family and loved ones.  She reflected on the financial burden this has resulted in.  Her mood and anxiety symptoms have also worsened as a result although she interprets meaningful improvement from her antidepressant medications, Paxil and Wellbutrin.  She recalls a prior good experience a couple of years ago on our unit, and as she was experiencing challenges related to alcohol use and withdrawal, she came to the emergency room seeking additional help.  She denied suicidal and homicidal thoughts.  There was no indication of psychosis.  No history of withdrawal seizures although she notes feeling shaky, malaise, poor appetite, leading to alcohol use to relieve the symptoms.    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; chronic     Past Surgical History:   Procedure Laterality Date    BREAST SURGERY      LAPAROSCOPIC GASTRIC SLEEVE      MASTECTOMY      TONSILLECTOMY       calcium citrate (CITRACAL) 950 (200 Ca) MG tablet  cholecalciferol 50 MCG (2000 UT) tablet  furosemide (LASIX) 20 MG tablet  gabapentin (NEURONTIN) 100 MG capsule  hydrOXYzine HCl (ATARAX) 25 MG tablet  lisinopril (ZESTRIL) 10 MG tablet  naltrexone (DEPADE/REVIA) 50 MG tablet  PAROXETINE HCL PO  QUEtiapine (SEROQUEL) 25 MG tablet  traZODone (DESYREL) 100 MG tablet  ATENOLOL PO  ATORVASTATIN CALCIUM PO  buPROPion (WELLBUTRIN XL) 300 MG 24 hr tablet  cloNIDine (CATAPRES) 0.1 MG tablet  cyclobenzaprine (FLEXERIL) 10 MG tablet  fluticasone (FLONASE) 50 MCG/ACT nasal spray  gabapentin (NEURONTIN) 600 MG tablet  hydrochlorothiazide (MICROZIDE) 12.5 MG capsule  HYDROcodone-acetaminophen (NORCO) 5-325 MG per tablet  METFORMIN HCL PO  NAPROXEN PO  nicotine (NICODERM CQ) 14 MG/24HR patch 2h hr  nicotine (NICODERM CQ) 7 MG/24HR patch 2h hr  OMEPRAZOLE PO  order for DME  pramipexole (MIRAPEX) 0.5 MG tablet  predniSONE (DELTASONE) 20 MG tablet      Allergies   Allergen Reactions    Wasp Venom Protein Shortness Of Breath    Bee Venom Swelling     Fever     Family History  History reviewed. No pertinent family history.  Social History   Social History     Tobacco Use    Smoking status: Every Day     Current packs/day: 0.25     Average packs/day: 0.3 packs/day for 30.0 years (7.5 ttl pk-yrs)     Types: Cigarettes    Smokeless tobacco: Never   Substance Use Topics    Alcohol use: Yes     Comment: vodka every day 1/4 liter a day for past 1.5 years.    Drug use: Never          Review of Systems  A medically appropriate review of systems was performed with pertinent positives and negatives noted in the HPI, and all other systems negative.    Physical Examination   BP: (!) 165/108  Pulse: 74  Temp: 97  F (36.1  C)  Resp: 18  Height: 162.6 cm (5' 4\")  Weight: 83 kg (183 " lb)  SpO2: 97 %    Physical Exam  General: Appears stated age.   Neuro: Alert and fully oriented. Extremities appear to demonstrate normal strength on visual inspection.   Integumentary/Skin: no rash visualized, normal color    Psychiatric Examination   Appearance: awake, alert  Attitude:  cooperative  Eye Contact:  fair  Mood:  anxious, sad , and better  Affect:  appropriate and in normal range  Speech:  clear, coherent  Psychomotor Behavior:  no evidence of tardive dyskinesia, dystonia, or tics  Thought Process:  logical and linear  Associations:  no loose associations  Thought Content:  no evidence of suicidal ideation or homicidal ideation and no evidence of psychotic thought  Insight:  fair  Judgement:  fair  Oriented to:  time, person, and place  Attention Span and Concentration:  fair  Recent and Remote Memory:  fair  Language: able to name/identify objects without impairment  Fund of Knowledge: intact with awareness of current and past events    ED Course     ED Course as of 04/28/25 1508   Mon Apr 28, 2025   0640 I obtained history and examined the patient as noted above.   0943 I discussed the patient's presentation with Fabiano from [facility]. Will talk to patient about transfer to [facility].       Labs Ordered and Resulted from Time of ED Arrival to Time of ED Departure   COMPREHENSIVE METABOLIC PANEL - Abnormal       Result Value    Sodium 135      Potassium 3.9      Carbon Dioxide (CO2) 22      Anion Gap 13      Urea Nitrogen 13.2      Creatinine 0.75      GFR Estimate >90      Calcium 9.8      Chloride 100      Glucose 104 (*)     Alkaline Phosphatase 74      AST 31      ALT 18      Protein Total 7.0      Albumin 4.1      Bilirubin Total 1.2     TROPONIN T, HIGH SENSITIVITY - Normal    Troponin T, High Sensitivity <6     MAGNESIUM - Normal    Magnesium 1.7     CBC WITH PLATELETS AND DIFFERENTIAL    WBC Count 4.7      RBC Count 4.05      Hemoglobin 12.8      Hematocrit 36.9      MCV 91      MCH 31.6      MCHC  34.7      RDW 13.2      Platelet Count 202      % Neutrophils 59      % Lymphocytes 31      % Monocytes 8      % Eosinophils 1      % Basophils 1      % Immature Granulocytes 0      NRBCs per 100 WBC 0      Absolute Neutrophils 2.8      Absolute Lymphocytes 1.5      Absolute Monocytes 0.4      Absolute Eosinophils 0.0      Absolute Basophils 0.0      Absolute Immature Granulocytes 0.0      Absolute NRBCs 0.0         Assessments & Plan (with Medical Decision Making)   Patient presenting with worsening anxiety, depressed mood, in the context of psychosocial stressors and increased alcohol use.  Emotional lability was recently magnified in the context of alcohol withdrawal.  Her treatment plan is focused on safely detoxing from alcohol given the magnitude of her withdrawal symptoms being intolerable and ensuring optimal mental health resources to maintain stability of her underlying mood and anxiety symptoms. Nursing notes reviewed noting no acute issues.     I have reviewed the assessment completed by the [facility].     During the observation period, the patient did not require medications for agitation, and did not require restraints/seclusion for patient and/or provider safety.     The patient was found to have a psychiatric condition that would benefit from an observation stay in the emergency department for further psychiatric stabilization and/or coordination of a safe disposition. The observation plan includes serial assessments of psychiatric condition, potential administration of medications if indicated, further disposition pending the patient's psychiatric course during the monitoring period.     Preliminary diagnosis:    ICD-10-CM    1. Insomnia due to other mental disorder  F51.05     F99       2. Major depressive disorder, recurrent episode, moderate (H)  F33.1       3. Alcohol use disorder, moderate, dependence (H)  F10.20            Treatment Plan:  - Resume Paxil 20 mg daily for antidepressant treatment  -  Resume Wellbutrin  mg daily beginning tomorrow.  Intentionally holding the dose today to minimize the risk of lowering her seizure threshold in the context of alcohol withdrawal.  - Gabapentin 300 mg 3 times a day to minimize anxiety and withdrawal symptoms  - Urine drug screen has been ordered  - Consider IOP for MICD treatment  - Outpatient mental health treatment including medication management and psychotherapy  - Enter to observation status and reassess tomorrow    --  Alfonso Simms MD   [facility] EMERGENCY DEPT  EmPATH Unit          Alfonso Simms MD  04/28/25 5998

## 2025-04-29 LAB
AMPHETAMINES UR QL SCN: ABNORMAL
BARBITURATES UR QL SCN: ABNORMAL
BENZODIAZ UR QL SCN: ABNORMAL
BZE UR QL SCN: ABNORMAL
CANNABINOIDS UR QL SCN: ABNORMAL
FENTANYL UR QL: ABNORMAL
OPIATES UR QL SCN: ABNORMAL
PCP QUAL URINE (ROCHE): ABNORMAL

## 2025-04-29 PROCEDURE — 99232 SBSQ HOSP IP/OBS MODERATE 35: CPT | Performed by: NURSE PRACTITIONER

## 2025-04-29 PROCEDURE — 250N000013 HC RX MED GY IP 250 OP 250 PS 637: Performed by: NURSE PRACTITIONER

## 2025-04-29 PROCEDURE — 80307 DRUG TEST PRSMV CHEM ANLYZR: CPT | Performed by: PSYCHIATRY & NEUROLOGY

## 2025-04-29 PROCEDURE — 250N000011 HC RX IP 250 OP 636: Performed by: PSYCHIATRY & NEUROLOGY

## 2025-04-29 PROCEDURE — 250N000013 HC RX MED GY IP 250 OP 250 PS 637: Performed by: PSYCHIATRY & NEUROLOGY

## 2025-04-29 PROCEDURE — G0378 HOSPITAL OBSERVATION PER HR: HCPCS

## 2025-04-29 RX ORDER — POLYETHYLENE GLYCOL 3350 17 G
2 POWDER IN PACKET (EA) ORAL
Status: DISCONTINUED | OUTPATIENT
Start: 2025-04-29 | End: 2025-04-30 | Stop reason: HOSPADM

## 2025-04-29 RX ADMIN — GABAPENTIN 300 MG: 300 CAPSULE ORAL at 20:03

## 2025-04-29 RX ADMIN — NICOTINE POLACRILEX 2 MG: 2 LOZENGE ORAL at 21:25

## 2025-04-29 RX ADMIN — TRAZODONE HYDROCHLORIDE 200 MG: 100 TABLET ORAL at 22:09

## 2025-04-29 RX ADMIN — BUPROPION HYDROCHLORIDE 300 MG: 300 TABLET, EXTENDED RELEASE ORAL at 09:01

## 2025-04-29 RX ADMIN — GABAPENTIN 300 MG: 300 CAPSULE ORAL at 13:53

## 2025-04-29 RX ADMIN — GABAPENTIN 300 MG: 300 CAPSULE ORAL at 09:00

## 2025-04-29 RX ADMIN — LISINOPRIL 10 MG: 10 TABLET ORAL at 09:14

## 2025-04-29 RX ADMIN — CLONIDINE HYDROCHLORIDE 0.1 MG: 0.1 TABLET ORAL at 21:02

## 2025-04-29 RX ADMIN — CLONIDINE HYDROCHLORIDE 0.1 MG: 0.1 TABLET ORAL at 07:15

## 2025-04-29 RX ADMIN — PAROXETINE HYDROCHLORIDE 30 MG: 10 TABLET, FILM COATED ORAL at 09:00

## 2025-04-29 RX ADMIN — HYDROXYZINE HYDROCHLORIDE 25 MG: 25 TABLET, FILM COATED ORAL at 16:16

## 2025-04-29 RX ADMIN — FUROSEMIDE 20 MG: 20 TABLET ORAL at 13:05

## 2025-04-29 RX ADMIN — Medication 1 TABLET: at 09:02

## 2025-04-29 RX ADMIN — CLONIDINE HYDROCHLORIDE 0.1 MG: 0.1 TABLET ORAL at 13:54

## 2025-04-29 RX ADMIN — QUETIAPINE FUMARATE 25 MG: 25 TABLET ORAL at 22:09

## 2025-04-29 RX ADMIN — ONDANSETRON 4 MG: 4 TABLET, ORALLY DISINTEGRATING ORAL at 16:16

## 2025-04-29 ASSESSMENT — LIFESTYLE VARIABLES
PAROXYSMAL SWEATS: NO SWEAT VISIBLE
TREMOR: NO TREMOR
ORIENTATION AND CLOUDING OF SENSORIUM: ORIENTED AND CAN DO SERIAL ADDITIONS
AGITATION: NORMAL ACTIVITY
ANXIETY: 2
VISUAL DISTURBANCES: NOT PRESENT
NAUSEA AND VOMITING: NO NAUSEA AND NO VOMITING
NAUSEA AND VOMITING: NO NAUSEA AND NO VOMITING
PAROXYSMAL SWEATS: NO SWEAT VISIBLE
AGITATION: NORMAL ACTIVITY
AUDITORY DISTURBANCES: NOT PRESENT
ANXIETY: MILDLY ANXIOUS
PAROXYSMAL SWEATS: NO SWEAT VISIBLE
NAUSEA AND VOMITING: 2
TREMOR: NO TREMOR
VISUAL DISTURBANCES: NOT PRESENT
TREMOR: NOT VISIBLE, BUT CAN BE FELT FINGERTIP TO FINGERTIP
HEADACHE, FULLNESS IN HEAD: NOT PRESENT
AGITATION: NORMAL ACTIVITY
HEADACHE, FULLNESS IN HEAD: NOT PRESENT
ANXIETY: 2
HEADACHE, FULLNESS IN HEAD: VERY MILD
TOTAL SCORE: 2
ORIENTATION AND CLOUDING OF SENSORIUM: ORIENTED AND CAN DO SERIAL ADDITIONS
ANXIETY: NO ANXIETY, AT EASE
AGITATION: NORMAL ACTIVITY
TOTAL SCORE: 1
VISUAL DISTURBANCES: NOT PRESENT
TREMOR: NOT VISIBLE, BUT CAN BE FELT FINGERTIP TO FINGERTIP
TOTAL SCORE: 2
ORIENTATION AND CLOUDING OF SENSORIUM: ORIENTED AND CAN DO SERIAL ADDITIONS
HEADACHE, FULLNESS IN HEAD: NOT PRESENT
VISUAL DISTURBANCES: NOT PRESENT
ORIENTATION AND CLOUDING OF SENSORIUM: ORIENTED AND CAN DO SERIAL ADDITIONS
AUDITORY DISTURBANCES: NOT PRESENT
PAROXYSMAL SWEATS: NO SWEAT VISIBLE
TOTAL SCORE: 5
NAUSEA AND VOMITING: NO NAUSEA AND NO VOMITING

## 2025-04-29 ASSESSMENT — ACTIVITIES OF DAILY LIVING (ADL)
ADLS_ACUITY_SCORE: 41

## 2025-04-29 NOTE — PROGRESS NOTES
"Triage & Transition Services, Extended Care     Therapy Progress Note    Patient: Nicolle goes by \"Nicolle,\" uses she/her pronouns  Date of Service: April 29, 2025  Site of Service: [facility] Emergency Dept                             EMP02  Patient was seen yes  Mode of Assessment: In person    Presentation Summary: Writer approached Pt to engage in therapeutic check-in. Pt was familiar with Writer from previous assessment on 04/28/2025. When prompted by Writer, Pt continues to present as not feeling well physically. Pt reports continued sweating, fatigue, and headaches. Pt reports her time at [facility] has been helpful, yet identifies not feeling completely stabilized. Pt does continue to endorse suicidal ideation at a 3/5, which Pt identifies is down from yesterday, yet still present. Pt does report feeling safe at [facility]. Pt also identifies needing to get rest and feeling that has been beneficial for her. When discussing care path, Writer identified how Pt will meet with the attending psychiatric provider today and can discuss her medical concerns with them and how to address them with medication. When discussing returning to the community, Pt identifies once feeling better, she can manage her substance use better and is wanting to discharge with medication that can help with her anxiety and her cravings.    Therapeutic Intervention(s) Provided: Engaged in guided discovery, explored patient's perspectives and helped expand them through socratic dialogue., Engaged in social skills training.    Current Symptoms:   thoughts of death/suicide somatic symptoms (abdominal pain, headache, tension), sweating, flushing, shaking        Mental Status Exam   Affect: Appropriate  Appearance: Appropriate  Attention Span/Concentration: Attentive  Eye Contact: Engaged    Fund of Knowledge: Appropriate   Language /Speech Content: Fluent  Language /Speech Volume: Normal  Language /Speech Rate/Productions: Normal  Recent " Memory: Intact  Remote Memory: Intact  Mood: Anxious  Orientation to Person: Yes   Orientation to Place: Yes  Orientation to Time of Day: Yes  Orientation to Date: Yes     Situation (Do they understand why they are here?): Yes  Psychomotor Behavior: Normal  Thought Content: Clear, Suicidal, Other (please comment) (endorses suicidal ideation with identified method of possibly crashing car, contracts for safety, denies any intent.)  Thought Form: Intact    Treatment Objective(s) Addressed: assessing safety, identifying treatment goals    Patient Response to Interventions: acceptance expressed, verbalizes understanding    Progress Towards Goals: Patient Reports Symptoms Are: improving  Patient Progress Toward Goals: is making progress  Comment: Pt does report a decrease in suicidal ideation, yet continues to endorse it.  Next Step to Work Toward Discharge: symptom stabilization  Symptom Stabilization Comment: Continue to assess for severity of suicidal ideation and physical symptoms.    Case Management: Summary of Interaction: NA    Plan: observation   (Information from this reassessment will be available to the attending psychiatric provider.)      yes    Clinical Substantiation:     When prompted by Writer, Pt continues to present as not feeling well physically. Pt reports continued sweating, fatigue, and headaches. Pt reports her time at [facility] has been helpful, yet identifies not feeling completely stabilized. Pt does continue to endorse suicidal ideation at a 3/5, which Pt identifies is down from yesterday, yet still present. Pt does report feeling safe at [facility]. Pt also identifies needing to get rest and feeling that has been beneficial for her. When discussing care path, Writer identified how Pt will meet with the attending psychiatric provider today and can discuss her medical concerns with them and how to address them with medication. When discussing returning to the community, Pt identifies once feeling  better, she can manage her substance use better and is wanting to discharge with medication that can help with her anxiety and her cravings.    At this time it does appear that Pt would benefit from further observation and psychiatric stabilization via medication management and ED level therapeutic interventions, due to continued suicidal ideation and wanting to detox safety from her alcohol use. Pt does appear to be at higher risk of death by suicide accidental or intentional due to mental health history and substance use history. If Pt's symptoms improve it would be beneficial to pursue a less restrictive alternative. Recommendation of therapeutic check-in on 04/30/2025 to assess for severity of suicidal ideation and withdrawal symptoms.     Legal Status: Legal Status: Voluntary/Patient has signed consent for treatment    Session Status: Time session started: 1301  Time session ended: 1317  Session Duration (minutes): 16 minutes  Session Number: 1  Anticipated number of sessions or this episode of care: 2    Time Spent: 16 minutes    CPT Code: CPT Codes: 16454 - Psychotherapy (with patient) - 30 (16-37*) min    Diagnosis:   Patient Active Problem List   Diagnosis Code    History of breast cancer Z85.3    Obesity (BMI 30-39.9) E66.9    Snoring R06.83    Diabetes type 2, controlled (H) E11.9    Essential hypertension I10    Pure hypercholesterolemia E78.00    Bilateral low back pain without sciatica M54.50    Gastroesophageal reflux disease with esophagitis K21.00    Tobacco use disorder F17.200    JUN (obstructive sleep apnea) G47.33    MDD (major depressive disorder) F32.9    RUDY (generalized anxiety disorder) F41.1    Substance use disorder F19.90    Alcohol use disorder, moderate, dependence (H) F10.20    Major depressive disorder, recurrent episode, moderate (H) F33.1    Insomnia due to other mental disorder F51.05, F99       Primary Problem This Admission: Active Hospital Problems    MDD (major depressive  disorder)      RUDY (generalized anxiety disorder)      Substance use disorder      Alcohol use disorder, moderate, dependence (H)      Major depressive disorder, recurrent episode, moderate (H)      Insomnia due to other mental disorder        Fabiano Cloud [facility]   Licensed Mental Health Professional (LMHP), [facility]  [phone]

## 2025-04-29 NOTE — ED NOTES
Patient spent the majority of the day resting in recliner for majority of the day. Continues to report she is feeling tired. She was encouraged to move around more and keep hydrated. Patient scored 2 on the CIWA scale at 0815 and 1 at 1220. Next CIWA reassessment is due by 1620 or PRN. Currently denies SI.

## 2025-04-29 NOTE — PROGRESS NOTES
Pt has been sleeping sound through the NOC with no sign of distress. No visible sign of withdrawals noted.

## 2025-04-29 NOTE — ED NOTES
Pt has been resting in her chair through the majority of the shift. Pt has been feeling increasingly tired this shift and has been wanting to rest. Pt was able to eat her dinner and do some reading but was ultimately keeping to herself and resting. Pt has not received any medications for withdrawal this shift and has been feeling slightly better through out the night. Pt continues to have anxiety but it is better after being here. Pt did restart all her medications with plan to reassess tomorrow. Nursing to continue to monitor.

## 2025-04-29 NOTE — PROGRESS NOTES
"Pt observed resting on the recliner watching tv. Pt reports she is feeling tired and states she is having anxiety. Pt states her anxiety is centered around thinking about returning to work and about her alcohol withdrawal symptoms. Pt reports her last drink was on Saturday evening. Pt states she has been drinking for the past eight months and states she drinks about four to five shots of vodka a day. Pt states she has gone through alcohol withdrawal in the past and states her symptoms include shakiness, anxiety, she feels tired and sleeps a lot and gets \"wobbly.\" Pt denies any SI/HI/Garcai. She endorses some pain in her back which she rates a 4/10. Pt requested and received a copy of her lab work. Pt requested and received PRN ondansetron 4mg for nausea and PRN hydroxyzine 25mg. Pt was given a specimen collection cup for pending UTOX collection.   "

## 2025-04-30 VITALS
HEART RATE: 68 BPM | WEIGHT: 183 LBS | BODY MASS INDEX: 31.24 KG/M2 | OXYGEN SATURATION: 97 % | TEMPERATURE: 98.4 F | DIASTOLIC BLOOD PRESSURE: 79 MMHG | SYSTOLIC BLOOD PRESSURE: 119 MMHG | RESPIRATION RATE: 16 BRPM | HEIGHT: 64 IN

## 2025-04-30 PROCEDURE — 250N000013 HC RX MED GY IP 250 OP 250 PS 637: Performed by: PSYCHIATRY & NEUROLOGY

## 2025-04-30 PROCEDURE — G0378 HOSPITAL OBSERVATION PER HR: HCPCS

## 2025-04-30 PROCEDURE — 99239 HOSP IP/OBS DSCHRG MGMT >30: CPT

## 2025-04-30 RX ORDER — PAROXETINE 30 MG/1
30 TABLET, FILM COATED ORAL DAILY
Qty: 14 TABLET | Refills: 0 | Status: SHIPPED | OUTPATIENT
Start: 2025-04-30

## 2025-04-30 RX ORDER — PAROXETINE 30 MG/1
30 TABLET, FILM COATED ORAL DAILY
COMMUNITY
Start: 2025-04-30 | End: 2025-04-30

## 2025-04-30 RX ORDER — HYDROXYZINE HYDROCHLORIDE 25 MG/1
25 TABLET, FILM COATED ORAL 3 TIMES DAILY PRN
Qty: 20 TABLET | Refills: 0 | Status: SHIPPED | OUTPATIENT
Start: 2025-04-30

## 2025-04-30 RX ADMIN — PAROXETINE HYDROCHLORIDE 30 MG: 10 TABLET, FILM COATED ORAL at 09:19

## 2025-04-30 RX ADMIN — FUROSEMIDE 20 MG: 20 TABLET ORAL at 09:17

## 2025-04-30 RX ADMIN — LORAZEPAM 1 MG: 1 TABLET ORAL at 09:23

## 2025-04-30 RX ADMIN — Medication 1 TABLET: at 09:18

## 2025-04-30 RX ADMIN — CLONIDINE HYDROCHLORIDE 0.1 MG: 0.1 TABLET ORAL at 05:22

## 2025-04-30 RX ADMIN — CLONIDINE HYDROCHLORIDE 0.1 MG: 0.1 TABLET ORAL at 13:07

## 2025-04-30 RX ADMIN — GABAPENTIN 300 MG: 300 CAPSULE ORAL at 14:50

## 2025-04-30 RX ADMIN — GABAPENTIN 300 MG: 300 CAPSULE ORAL at 09:21

## 2025-04-30 RX ADMIN — LISINOPRIL 10 MG: 10 TABLET ORAL at 09:19

## 2025-04-30 RX ADMIN — BUPROPION HYDROCHLORIDE 300 MG: 300 TABLET, EXTENDED RELEASE ORAL at 09:18

## 2025-04-30 ASSESSMENT — ACTIVITIES OF DAILY LIVING (ADL)
ADLS_ACUITY_SCORE: 41

## 2025-04-30 ASSESSMENT — COLUMBIA-SUICIDE SEVERITY RATING SCALE - C-SSRS
ATTEMPT SINCE LAST CONTACT: NO
1. SINCE LAST CONTACT, HAVE YOU WISHED YOU WERE DEAD OR WISHED YOU COULD GO TO SLEEP AND NOT WAKE UP?: NO
6. HAVE YOU EVER DONE ANYTHING, STARTED TO DO ANYTHING, OR PREPARED TO DO ANYTHING TO END YOUR LIFE?: NO
SUICIDE, SINCE LAST CONTACT: NO
TOTAL  NUMBER OF ABORTED OR SELF INTERRUPTED ATTEMPTS SINCE LAST CONTACT: NO
2. HAVE YOU ACTUALLY HAD ANY THOUGHTS OF KILLING YOURSELF?: NO
TOTAL  NUMBER OF INTERRUPTED ATTEMPTS SINCE LAST CONTACT: NO

## 2025-04-30 ASSESSMENT — LIFESTYLE VARIABLES
AUDITORY DISTURBANCES: NOT PRESENT
TREMOR: NO TREMOR
ANXIETY: 3
ANXIETY: 2
PAROXYSMAL SWEATS: NO SWEAT VISIBLE
ORIENTATION AND CLOUDING OF SENSORIUM: ORIENTED AND CAN DO SERIAL ADDITIONS
ORIENTATION AND CLOUDING OF SENSORIUM: ORIENTED AND CAN DO SERIAL ADDITIONS
AGITATION: NORMAL ACTIVITY
HEADACHE, FULLNESS IN HEAD: NOT PRESENT
NAUSEA AND VOMITING: MILD NAUSEA WITH NO VOMITING
ORIENTATION AND CLOUDING OF SENSORIUM: ORIENTED AND CAN DO SERIAL ADDITIONS
TOTAL SCORE: 3
AGITATION: NORMAL ACTIVITY
AUDITORY DISTURBANCES: NOT PRESENT
NAUSEA AND VOMITING: NO NAUSEA AND NO VOMITING
AUDITORY DISTURBANCES: NOT PRESENT
TOTAL SCORE: 0
HEADACHE, FULLNESS IN HEAD: MILD
PAROXYSMAL SWEATS: BARELY PERCEPTIBLE SWEATING, PALMS MOIST
TREMOR: NOT VISIBLE, BUT CAN BE FELT FINGERTIP TO FINGERTIP
NAUSEA AND VOMITING: NO NAUSEA AND NO VOMITING
TOTAL SCORE: 7
VISUAL DISTURBANCES: NOT PRESENT
HEADACHE, FULLNESS IN HEAD: NOT PRESENT
VISUAL DISTURBANCES: NOT PRESENT
AGITATION: NORMAL ACTIVITY
ANXIETY: NO ANXIETY, AT EASE
TREMOR: NO TREMOR
VISUAL DISTURBANCES: NOT PRESENT
PAROXYSMAL SWEATS: NO SWEAT VISIBLE

## 2025-04-30 NOTE — PROGRESS NOTES
Pt woke to use the BR, calm with no complaints. CIWA score 3. She did receive a scheduled dose of clonidine.

## 2025-04-30 NOTE — PROGRESS NOTES
Triage & Transition Services, Extended Care     Client Name: Nicolle Naidu    Date: April 29, 2025    Patient was seen yes  Mode of Assessment: In person    Service Type: attended group session  Session Start Time:  1700    Session End Time: 1705  Session Length: 5  Site Location: [facility] Emergency Dept                             EMP02  Total Number of Attendees: N/A  Topic:   structured socialization   Response: other (see comments) (Pt sleeping in recliner.)     BALDO Sanchez   Licensed Mental Health Professional (LMHP), Extended Care  [phone]

## 2025-04-30 NOTE — ED NOTES
Patient agrees to discharge plan. Discharge instructions reviewed with patient including follow-up care plan. Medications: sent home with pt. Reviewed safety plan and outpatient resources. Denies SI and HI. All belongings that were brought into the hospital have been returned to patient. Escorted off the unit at 1515 accompanied by [facility] staff. Discharged to home via self.

## 2025-04-30 NOTE — ED NOTES
Patient sleeping in chair # 2 and scores # 7 for some nausea,# 2 headache and anxiety. She denies SI and is calm and cooperative. She ate breakfast and has been sleeping since eating breakfast.

## 2025-04-30 NOTE — ED PROVIDER NOTES
"[facility] Unit - Psychiatric Observation Discharge Summary  [facility] Emergency Department  Discharge Date: 4/30/2025    Nicolle Naidu MRN: [id number]   Age: 60 year old YOB: 1965     Brief HPI & Initial ED Course     Chief Complaint   Patient presents with    Hypertension    Depression     HPI  Nicolle Naidu is a 60 year old female with history notable for major depressive disorder and anxiety further complicated by an alcohol use disorder. She initially presented to the D with concerns for worsening mood, suicidal ideation, and symptoms of alcohol withdrawal. In the emergency department, this patient was determined to be medically stable and transferred to the [facility] unit for psychiatric assessment.  She is now nearing 54 hours in the emergency department. Treatment plan has been largely focused on monitoring for alcohol withdrawal and stabilizing mental health symptoms. On examination, patient reports that she feels much better and is denying any sx of withdrawal. She endorses passive SI, denies plan and denies intent. She explains she has periodically struggled with passive SI since the passing of her  in June of 2024. She denies HI and there's no evidence of psychosis. She identifies her sons and daughters as significant supports. She has an established OP psychiatric medication provider who she plans to follow-up with. She has not seen them for approximately 2 months given perceived stability. She notes she is almost out of paxil and hydroxyzine and would like refills of these prior to discharge. She is interested in pursuing additional BARRETT supports through [facility]. She interprets readiness to discharge following the conclusion of our meeting.       Physical Examination   BP: 127/89  Pulse: 64  Temp: 97.9  F (36.6  C)  Resp: 16  Height: 162.6 cm (5' 4\")  Weight: 83 kg (183 lb)  SpO2: 96 %    Physical Exam  General: Appears stated age.   Neuro: Alert and fully oriented. " Extremities appear to demonstrate normal strength on visual inspection.   Integumentary/Skin: no rash visualized, normal color    Psychiatric Examination   Appearance: awake, alert, adequately groomed, dressed in hospital scrubs, and appeared as age stated  Attitude:  cooperative  Eye Contact:  good  Mood:  better  Affect:  appropriate and in normal range and mood congruent  Speech:  clear, coherent and normal prosody  Psychomotor Behavior:  no evidence of tardive dyskinesia, dystonia, or tics and intact station, gait and muscle tone  Thought Process:  logical and linear  Associations:  no loose associations  Thought Content:  no evidence of psychotic thought and passive suicidal ideation present- denies plan and denies intent, denies HI  Insight:  fair  Judgement:  fair  Oriented to:  time, person, and place  Attention Span and Concentration:  intact  Recent and Remote Memory:  intact  Language: able to name/identify objects without impairment  Fund of Knowledge: intact with awareness of current and past events    Results     ED Course as of 04/30/25 1243   Mon Apr 28, 2025   0640 I obtained history and examined the patient as noted above.   0933 I discussed the patient's presentation with Fabiano from [facility]. Will talk to patient about transfer to [facility].       Labs Ordered and Resulted from Time of ED Arrival to Time of ED Departure   COMPREHENSIVE METABOLIC PANEL - Abnormal       Result Value    Sodium 135      Potassium 3.9      Carbon Dioxide (CO2) 22      Anion Gap 13      Urea Nitrogen 13.2      Creatinine 0.75      GFR Estimate >90      Calcium 9.8      Chloride 100      Glucose 104 (*)     Alkaline Phosphatase 74      AST 31      ALT 18      Protein Total 7.0      Albumin 4.1      Bilirubin Total 1.2     URINE DRUG SCREEN PANEL - Abnormal    Amphetamines Urine Screen Positive (*)     Barbituates Urine Screen Negative      Benzodiazepine Urine Screen Positive (*)     Cannabinoids Urine Screen Negative       Cocaine Urine Screen Negative      Fentanyl Qual Urine Screen Negative      Opiates Urine Screen Negative      PCP Urine Screen Negative     TROPONIN T, HIGH SENSITIVITY - Normal    Troponin T, High Sensitivity <6     MAGNESIUM - Normal    Magnesium 1.7     CBC WITH PLATELETS AND DIFFERENTIAL    WBC Count 4.7      RBC Count 4.05      Hemoglobin 12.8      Hematocrit 36.9      MCV 91      MCH 31.6      MCHC 34.7      RDW 13.2      Platelet Count 202      % Neutrophils 59      % Lymphocytes 31      % Monocytes 8      % Eosinophils 1      % Basophils 1      % Immature Granulocytes 0      NRBCs per 100 WBC 0      Absolute Neutrophils 2.8      Absolute Lymphocytes 1.5      Absolute Monocytes 0.4      Absolute Eosinophils 0.0      Absolute Basophils 0.0      Absolute Immature Granulocytes 0.0      Absolute NRBCs 0.0         Observation Course   The patient was found to have a psychiatric condition that would benefit from an observation stay in the emergency department for further psychiatric stabilization and/or coordination of a safe disposition. The plan upon observation admission included serial assessments of psychiatric condition, potential administration of medications if indicated, further disposition pending the patient's psychiatric course during the monitoring period.     Serial assessments of the patient's psychiatric condition were performed. Nursing notes were reviewed. During the observation period, the patient did not require medications for agitation, and did not require restraints/seclusion for patient and/or provider safety.     After a period of working with the treatment team on the [facility] unit, the patient's mental state improved to allow a safe transition to outpatient care. After counseling on the diagnosis, work-up, and treatment plan, the patient was discharged. Close follow-up with a psychiatrist and/or therapist was recommended and community psychiatric resources were provided. Patient is to  return to the ED if any urgent or potentially life-threatening concerns.     Discharge Diagnoses:   Final diagnoses:   Insomnia due to other mental disorder   Major depressive disorder, recurrent episode, moderate (H)   Alcohol use disorder, moderate, dependence (H)       Treatment Plan:  -Continue PTA medications including paxil, Wellbutrin XL, gabapentin, and Seroquel. Discussed risks of continued alcohol use while taking Wellbutrin due to decreased seizure threshold in conjunction with alcohol withdrawal, patient denies hx of seizures.   -Medication education provided this visit including but not limited to: Rationale for medication, importance of medication adherence, medication interactions, common medication side effects, benefits of medications.  -Recommendation to abstain from substance use   -Anticipate resuming outpatient medication management appointments  -Patient would like to utilize [facility] for additional BARRETT supports   -Problem focused supportive therapy and education provided today related to patient's current and acute stressors, symptoms, and diagnoses.  -Discharge from [facility] with OP and crisis supports     At the time of discharge, the patient's acute suicide risk was determined to be low due to the following factors: Reduction in the intensity of mood/anxiety symptoms that preceded the admission, denial of suicidal thoughts, denies feeling helpless or hopeless, not currently under the influence of alcohol or illicit substances, denies experiencing command hallucinations, no immediate access to firearms. The patient's acute risk could be higher if noncompliant with their treatment plan, medications, follow-up appointments or using illicit substances or alcohol. Protective factors include: social supports, children, stable housing, help seeking     I spent more than 31 minutes on discharge day activities.    --  RUPINDER Woodson CNP  [facility] EMERGENCY DEPT  [facility]  Unit       Kami Schroeder, RUPINDER CNP  04/30/25 [id number]

## 2025-04-30 NOTE — PROGRESS NOTES
"Triage and Transition Services Extended Care Reassessment     Patient: Nicolle goes by \"Nicolle,\" uses she/her pronouns  Date of Service: April 30, 2025  Site of Service: [facility]                             [id number]  Patient was seen yes  Mode of Assessment: In person     Reason for Reassessment: other (see comment) (daily therapeutic check-in, anticipating discharge)    History of Patient's Original Emergency Room Encounter: Pt reports historical diagnoses of MDD and RUDY. Pt denies any history of mental health inpatient hospitalization, IOP, or PHP, yet reports completing residential treatment and a step-down program through [facility] for substance use in 2022. Pt reports living at home with her 2 adult sons.    Current Patient Presentation: Pt is calm, cooperative, and engaged with Writer during therapeutic check-in.    Presentation Summary: Writer approached Pt to engage in therapeutic check-in. Pt was familiar with Writer from previous assessment on 04/28/2025. When prompted by Writer, Pt reports feeling better today. Pt reports she was able to get restful sleep and reports feeling readiness to discharge. When prompted, Pt reports not experiencing any suicidal ideation at time of reassessment, yet when discussing having to rate the severity, Pt identifes the severity since last reassessment was a \"0.5\" out of 5, yet again denies any active or current suicidal ideation. Pt also denies any HI, AH/VH and reports her vision is doing better than yesterday. When discussing outpatient services, Pt reports wanting paperwork for her new medication regiment to be sent to her psychiatric provider through [facility]. Discussed how medication information will also be on her AVS. When discussing therapy, Pt appropriately declines getting established with an appointment at this time. Pt identified wanting to pursue CD treatment through [facility]. Writer discussed how he will find " contact information and provide it to Pt. Pt also reports wanting a work note for her time away this week. Writer and Pt discussed aftercare/safety planning.    Changes Observed Since Initial Assessment: decrease in presenting symptoms    Therapeutic Interventions Provided: Engaged in guided discovery, explored patient's perspectives and helped expand them through socratic dialogue., Engaged in social skills training., Engaged in safety planning    Current Symptoms: anxious  (No current suicidal ideation during the reassessment.)  (None observed or reported.)  (None observed or reported.)  (None observed or reported.)    Mental Status Exam   Affect: Appropriate  Appearance: Appropriate  Attention Span/Concentration: Attentive  Eye Contact: Engaged    Fund of Knowledge: Appropriate   Language /Speech Content: Fluent  Language /Speech Volume: Normal  Language /Speech Rate/Productions: Normal  Recent Memory: Intact  Remote Memory: Intact  Mood: Anxious  Orientation to Person: Yes   Orientation to Place: Yes  Orientation to Time of Day: Yes  Orientation to Date: Yes     Situation (Do they understand why they are here?): Yes  Psychomotor Behavior: Normal  Thought Content: Clear  Thought Form: Intact    Treatment Objective(s) Addressed: assessing safety, identifying treatment goals, identifying an appropriate aftercare plan, safety planning    Patient Response to Interventions: acceptance expressed, verbalizes understanding    Progress Towards Goals:  Patient Reports Symptoms Are: improving  Patient Progress Toward Goals: is making progress  Comment: Pt denies any active SI, HI, AH/VH. Pt reports feeling better physically.  Next Step to Work Toward Discharge: patient ability to engage in safety planning  Symptom Stabilization Comment: Continue to assess for severity of suicidal ideation and physical symptoms.  Ability to Engage Comment: Have Pt engage in and complete an aftercare/safety plan.    Case Management: Summary  "of Interaction: NA    C-SSRS Since Last Contact:   1. Wish to be Dead (Since Last Contact): No  2. Non-Specific Active Suicidal Thoughts (Since Last Contact): No     Actual Attempt (Since Last Contact): No  Has subject engaged in non-suicidal self-injurious behavior? (Since Last Contact): No  Interrupted Attempts (Since Last Contact): No  Aborted or Self-Interrupted Attempt (Since Last Contact): No  Preparatory Acts or Behavior (Since Last Contact): No  Suicide (Since Last Contact): No     Calculated C-SSRS Risk Score (Since Last Contact): No Risk Indicated    Plan: Final Disposition / Recommended Care Path: discharge  Plan for Care reviewed with assigned Medical Provider: yes  Plan for Care Team Review: provider, RN  Comments: Kami Schroeder  Patient and/or validated legal guardian concurs: yes  Clinical Substantiation:     When prompted by Writer, Pt reports feeling better today. Pt reports she was able to get restful sleep and reports feeling readiness to discharge. When prompted, Pt reports not experiencing any suicidal ideation at time of reassessment, yet when discussing having to rate the severity, Pt identifes the severity since last reassessment was a \"0.5\" out of 5, yet again denies any active or current suicidal ideation. Pt also denies any HI, AH/VH and reports her vision is doing better than yesterday. When discussing outpatient services, Pt reports wanting paperwork for her new medication regiment to be sent to her psychiatric provider through [facility]. Discussed how medication information will also be on her AVS. When discussing therapy, Pt appropriately declines getting established with an appointment at this time. Pt identified wanting to pursue CD treatment through [facility]. Writer discussed how he will find contact information and provide it to Pt. Pt also reports wanting a work note for her time away this week. Writer and Pt discussed aftercare/safety planning.    At this " time IP MH admission is not being recommended due to denial of active SI, HI, AH/VH. Pt was able to safety plan with Writer. Pt's current presentation appears to be chronic in nature and Pt's current symptoms appear to be at Pt's baseline. Pt does appear to be at higher risk of death by suicide accidental or intentional due to mental health history and substance use symptoms.  At this time IP MH admission does not appear to be the most therapeutically beneficial intervention/ level of care for Pt. Pt appears to be able to use and motivated to engage in supportive mental health/ community resources.         Legal Status: Legal Status: Voluntary/Patient has signed consent for treatment    Session Status: Time session started: 1145; 1414  Time session ended: 1148; 1435  Session Duration (minutes): 24 minutes  Session Number: 2  Anticipated number of sessions or this episode of care: 2    Session Start Time: 1145  Session Stop Time: 1148  CPT codes: 82690 - Psychotherapy (with patient) - 30 (16-37*) min  Time Spent: 24 minutes      CPT code(s) utilized: 76634 - Psychotherapy (with patient) - 30 (16-37*) min    Diagnosis:   Patient Active Problem List   Diagnosis Code    History of breast cancer Z85.3    Obesity (BMI 30-39.9) E66.9    Snoring R06.83    Diabetes type 2, controlled (H) E11.9    Essential hypertension I10    Pure hypercholesterolemia E78.00    Bilateral low back pain without sciatica M54.50    Gastroesophageal reflux disease with esophagitis K21.00    Tobacco use disorder F17.200    JUN (obstructive sleep apnea) G47.33    MDD (major depressive disorder) F32.9    RUDY (generalized anxiety disorder) F41.1    Substance use disorder F19.90    Alcohol use disorder, moderate, dependence (H) F10.20    Major depressive disorder, recurrent episode, moderate (H) F33.1    Insomnia due to other mental disorder F51.05, F99       Primary Problem This Admission: Active Hospital Problems    MDD (major depressive  disorder)      RUDY (generalized anxiety disorder)      Substance use disorder      Alcohol use disorder, moderate, dependence (H)      Major depressive disorder, recurrent episode, moderate (H)      Insomnia due to other mental disorder        Fabiano Cloud [facility]   Licensed Mental Health Professional (LMHP), [facility]  [phone]

## 2025-04-30 NOTE — ED PROVIDER NOTES
ED Observation Psychiatric [facility] Emergency Department - [facility]  Observation Initiation Date: Apr 28, 2025    Nicolle Naidu MRN: [id number]   Age: 60 year old YOB: 1965     History     Chief Complaint   Patient presents with    Hypertension    Depression     HPI  Nicolle Naidu is a 60 year old female with PMH notable for depression and alcohol use disorder, admitted to the ED Observation Unit with merrick blood pressure and anxiety secondary to EtOH withdrawal.  Today is her second day on the unit.  Her medical history includes diagnoses of insomnia, major depressive disorder, and moderate alcohol use disorder.  It was noted that her alcohol consumption increased significantly after the death of her  in June 2024.  She is a mother of 2 adult sons who reside with her, both of whom are also abusing alcohol.    Since her admission, the patient has reported stabilization to the extent that she is no longer experiencing passive suicidal ideation.  Her mood has been described as calmer, and she has expressed that she does not feel depressed at this time.  There is no history of jose or psychosis, nor is there evidence of delusional or paranoid ideation.  Apparently, she has not reported any other substance use beyond alcohol.    Her blood pressure is stabilizing and she has not exhibited symptoms of withdrawal or seizure activity.  Throughout the day she has been focusing and recovery, with adequate food and fluid intake.  Lab results were reviewed with the patient, revealing positive urine drug screen for amphetamines and benzodiazepines potentially due to the use of benzodiazepines per CIWA scale and Wellbutrin, as she denied any substance use other than alcohol.  A slightly elevated glucose level of 104 was noted, while liver and kidney function test returned normal results.    The patient expressed relief upon learning that her lab results were mostly normal, which has  further motivated her to pursue chemical dependency treatment.  A list of potential treatment options will be provided, and she plans to discuss these with her psychiatric provider.  She has decided to remain in the unit overnight, with a likely discharge planning on the following day.  Despite circumstances, she declined any changes in her medication regimen.    Past Medical History  Past Medical History:   Diagnosis Date    Degenerative disorder of bone 2010    back; chronic     Past Surgical History:   Procedure Laterality Date    BREAST SURGERY      LAPAROSCOPIC GASTRIC SLEEVE      MASTECTOMY      TONSILLECTOMY       calcium citrate (CITRACAL) 950 (200 Ca) MG tablet  cholecalciferol 50 MCG (2000 UT) tablet  furosemide (LASIX) 20 MG tablet  gabapentin (NEURONTIN) 100 MG capsule  hydrOXYzine HCl (ATARAX) 25 MG tablet  lisinopril (ZESTRIL) 10 MG tablet  naltrexone (DEPADE/REVIA) 50 MG tablet  PAROXETINE HCL PO  QUEtiapine (SEROQUEL) 25 MG tablet  traZODone (DESYREL) 100 MG tablet  ATENOLOL PO  ATORVASTATIN CALCIUM PO  buPROPion (WELLBUTRIN XL) 300 MG 24 hr tablet  cloNIDine (CATAPRES) 0.1 MG tablet  cyclobenzaprine (FLEXERIL) 10 MG tablet  fluticasone (FLONASE) 50 MCG/ACT nasal spray  gabapentin (NEURONTIN) 600 MG tablet  hydrochlorothiazide (MICROZIDE) 12.5 MG capsule  HYDROcodone-acetaminophen (NORCO) 5-325 MG per tablet  METFORMIN HCL PO  NAPROXEN PO  nicotine (NICODERM CQ) 14 MG/24HR patch 2h hr  nicotine (NICODERM CQ) 7 MG/24HR patch 2h hr  OMEPRAZOLE PO  order for DME  pramipexole (MIRAPEX) 0.5 MG tablet  predniSONE (DELTASONE) 20 MG tablet      Allergies   Allergen Reactions    Wasp Venom Protein Shortness Of Breath    Bee Venom Swelling     Fever     Family History  History reviewed. No pertinent family history.  Social History   Social History     Tobacco Use    Smoking status: Every Day     Current packs/day: 0.25     Average packs/day: 0.3 packs/day for 30.0 years (7.5 ttl pk-yrs)     Types: Cigarettes  "   Smokeless tobacco: Never   Substance Use Topics    Alcohol use: Yes     Comment: vodka every day 1/4 liter a day for past 1.5 years.    Drug use: Never      Past medical history, past surgical history, medications, allergies, family history, and social history were reviewed with the patient. No additional pertinent items.       Review of Systems  A medically appropriate review of systems was performed with pertinent positives and negatives noted in the HPI, and all other systems negative.    Physical Examination   BP: (!) 165/108  Pulse: 74  Temp: 97  F (36.1  C)  Resp: 18  Height: 162.6 cm (5' 4\")  Weight: 83 kg (183 lb)  SpO2: 97 %    Physical Exam  General: . Appears stated age.   Neuro: alert and fully oriented. CN II-XII grossly intact. Grossly normal strength and sensation in all extremities.   Integumentary/Skin: no rash visualized, normal color    Psychiatric Examination   Appearance: awake, alert  Attitude:  cooperative  Eye Contact:  good  Mood:  better  Affect:  appropriate and in normal range  Speech:  clear, coherent  Psychomotor Behavior:  no evidence of tardive dyskinesia, dystonia, or tics  Throught Process:  logical  Associations:  no loose associations  Thought Content:  no evidence of suicidal ideation or homicidal ideation  Insight:  good  Judgement:  intact  Oriented to:  time, person, and place  Attention Span and Concentration:  intact  Recent and Remote Memory:  intact    ED Course     ED Course as of 04/29/25 2106 Mon Apr 28, 2025   0640 I obtained history and examined the patient as noted above.   5385 I discussed the patient's presentation with Fabiano from DEC. Will talk to patient about transfer to [facility].       Labs Ordered and Resulted from Time of ED Arrival to Time of ED Departure   COMPREHENSIVE METABOLIC PANEL - Abnormal       Result Value    Sodium 135      Potassium 3.9      Carbon Dioxide (CO2) 22      Anion Gap 13      Urea Nitrogen 13.2      Creatinine 0.75      GFR " Estimate >90      Calcium 9.8      Chloride 100      Glucose 104 (*)     Alkaline Phosphatase 74      AST 31      ALT 18      Protein Total 7.0      Albumin 4.1      Bilirubin Total 1.2     URINE DRUG SCREEN PANEL - Abnormal    Amphetamines Urine Screen Positive (*)     Barbituates Urine Screen Negative      Benzodiazepine Urine Screen Positive (*)     Cannabinoids Urine Screen Negative      Cocaine Urine Screen Negative      Fentanyl Qual Urine Screen Negative      Opiates Urine Screen Negative      PCP Urine Screen Negative     TROPONIN T, HIGH SENSITIVITY - Normal    Troponin T, High Sensitivity <6     MAGNESIUM - Normal    Magnesium 1.7     CBC WITH PLATELETS AND DIFFERENTIAL    WBC Count 4.7      RBC Count 4.05      Hemoglobin 12.8      Hematocrit 36.9      MCV 91      MCH 31.6      MCHC 34.7      RDW 13.2      Platelet Count 202      % Neutrophils 59      % Lymphocytes 31      % Monocytes 8      % Eosinophils 1      % Basophils 1      % Immature Granulocytes 0      NRBCs per 100 WBC 0      Absolute Neutrophils 2.8      Absolute Lymphocytes 1.5      Absolute Monocytes 0.4      Absolute Eosinophils 0.0      Absolute Basophils 0.0      Absolute Immature Granulocytes 0.0      Absolute NRBCs 0.0         Assessments & Plan (with Medical Decision Making)   Patient presenting with anxiety, withdrawing from alcohol.  The patient, a 60-year-old female, presents with insomnia, major depressive disorder, and moderate alcohol use disorder.  She reported elevated blood pressure upon admission, attributed to alcohol withdrawal.  Since her 's passing in June of a previous year, her alcohol consumption increased significantly.  She resides with her 2 adult sons, who also struggled with alcohol use.  Upon evaluation, she noted stabilization of her condition, with no current passive suicidal ideation and a calmer mood.  She denied feeling depressed and reported no history of jose or psychosis.  There was no evidence of  delusional or paranoid ideation, and no other substance use was reported.  Her blood pressure has stabilized and she is not experiencing withdrawal symptoms or seizure activity.  She has been recovering well, with adequate food and fluid intake.  Lab results were reviewed showing a positive drug screen for amphetamines and benzodiazepines, likely due to prescribed medications, and a slightly elevated glucose at 104.  Liver and kidney function tests were normal.  The patient expressed relief and motivation to complete chemical dependency treatment.  She plans to discuss treatment options with her psychiatric provider and has decided to stay on the unit overnight, with a likely discharge tomorrow.  She declined any changes to her current medications.  Nursing notes reviewed.     Plan:  Insomnia.  The condition is currently stable.  The patient will continue with her current management plan, including maintaining a consistent sleep schedule and avoiding alcohol.    Major depressive disorder.  The condition is improving.  The patient will continue with supportive therapy and chemical dependency treatment to address underlying issues.    Alcohol use disorder, moderate.  The condition is improving.  The patient will be provided with a list of chemical dependency treatment options and we will discuss those with her psychiatric provider.  No changes to her medication regimen were made.  Follow-up plans include monitoring her progress and ensuring she has access to supportive resources.    Place on observation status for further crisis stabilization and medication management.  -Continue home medications  -EmPATH standing order medications for comfort, anxiety and agitation.    -Does not meet criteria for a 72 hour hold due to no acute imminent risk to himself or others.  There is a higher risk due to  worsening symptoms, but at this time, that imminent risk is not present.  -Reassess in the    -[facility] working on community  resources as patient lacks sufficient interpersonal support.  -Medication change was considered today. Current medications are providing clinical benefit with good tolerability. Thus, no changes were made.  -They report gaining benefit from the therapeutic milieu of the unit.  -Continue to monitor for alcohol withdrawal symptoms although their vitals appear to have stabilized and they did not exhibit any withdrawal symptoms on examination today.  At this point, their risk of a complicated withdrawal syndrome should be low  -They are comfortable with staying another night on the unit and anticipate further reduction of symptoms by tomorrow.  -Problem focused, supportive therapy provided around patients stressors and symptoms related to their diagnosis.  Validated patients emotions and problems solved with patient around stress management and self care strategies. Patient was receptive.   -Medication education provided this visit includes, rationale for medication, importance of compliance, medication interactions, and common side effects. Patient agreeable.  -The patient was educated regarding their differential diagnoses and the importance of adhering to their treatment plan and outpatient follow up appointments to aid with diagnostic clarity.  -At this time, diagnosis remains complicated by ongoing illicit substance usage.  Diagnostic clarity would be more likely following a period of sobriety combined with appropriate follow through with outpatient providers for continued examination.    I, Jazzy Massey, DNP, APRN, FMHNP-BC, have personally performed an examination of this patient on 04/29/25.  I have edited the note to reflect all relevant changes.  I have discussed this patient with the care team.  I have reviewed all vitals and laboratory findings.    I have reviewed the DEC assessment.    During the observation period, the patient did not require medications for agitation, and did not require  restraints/seclusion for patient and/or provider safety.     The patient was found to have a psychiatric condition that would benefit from an observation stay in the emergency department for further psychiatric stabilization and/or coordination of a safe disposition. The observation plan includes serial assessments of psychiatric condition, potential administration of medications if indicated, further disposition pending the patient's psychiatric course during the monitoring period.     Preliminary diagnosis:  Insomnia F51.01  MDD F33.1  Alcohol use disorder, moderate F10.20    --  RUPINDER Burden CNP   [facility] EMERGENCY DEPT  EmPATH Unit  4/28/2025          Jazzy Holman APRN CNP  04/29/25 [id number]

## 2025-05-01 ENCOUNTER — TELEPHONE (OUTPATIENT)
Dept: BEHAVIORAL HEALTH | Facility: CLINIC | Age: 60
End: 2025-05-01
Payer: COMMERCIAL

## 2025-05-01 NOTE — TELEPHONE ENCOUNTER
Called Patient to speak with them about the possible need for further appointments and resources.     Pt declined

## 2025-05-06 ENCOUNTER — TRANSFERRED RECORDS (OUTPATIENT)
Dept: HEALTH INFORMATION MANAGEMENT | Facility: CLINIC | Age: 60
End: 2025-05-06
Payer: COMMERCIAL

## 2025-05-10 ENCOUNTER — HEALTH MAINTENANCE LETTER (OUTPATIENT)
Age: 60
End: 2025-05-10

## 2025-05-26 ENCOUNTER — APPOINTMENT (OUTPATIENT)
Dept: CT IMAGING | Facility: CLINIC | Age: 60
End: 2025-05-26
Attending: EMERGENCY MEDICINE
Payer: COMMERCIAL

## 2025-05-26 ENCOUNTER — HOSPITAL ENCOUNTER (EMERGENCY)
Facility: CLINIC | Age: 60
Discharge: HOME | End: 2025-05-26
Attending: EMERGENCY MEDICINE | Admitting: EMERGENCY MEDICINE
Payer: COMMERCIAL

## 2025-05-26 VITALS
BODY MASS INDEX: 31.58 KG/M2 | OXYGEN SATURATION: 95 % | SYSTOLIC BLOOD PRESSURE: 128 MMHG | TEMPERATURE: 97.9 F | RESPIRATION RATE: 16 BRPM | WEIGHT: 185 LBS | HEIGHT: 64 IN | DIASTOLIC BLOOD PRESSURE: 84 MMHG | HEART RATE: 72 BPM

## 2025-05-26 DIAGNOSIS — Z87.898 HISTORY OF ALCOHOL USE DISORDER: ICD-10-CM

## 2025-05-26 DIAGNOSIS — H53.9 VISUAL DISTURBANCE: ICD-10-CM

## 2025-05-26 DIAGNOSIS — Z87.891 HISTORY OF TOBACCO USE DISORDER: ICD-10-CM

## 2025-05-26 LAB
ALBUMIN SERPL BCG-MCNC: 3.9 G/DL (ref 3.5–5.2)
ALP SERPL-CCNC: 67 U/L (ref 40–150)
ALT SERPL W P-5'-P-CCNC: 24 U/L (ref 0–50)
ANION GAP SERPL CALCULATED.3IONS-SCNC: 8 MMOL/L (ref 7–15)
AST SERPL W P-5'-P-CCNC: 25 U/L (ref 0–45)
BASOPHILS # BLD AUTO: 0 10E3/UL (ref 0–0.2)
BASOPHILS NFR BLD AUTO: 1 %
BILIRUB SERPL-MCNC: 0.2 MG/DL
BUN SERPL-MCNC: 12.3 MG/DL (ref 8–23)
CALCIUM SERPL-MCNC: 9.7 MG/DL (ref 8.8–10.4)
CHLORIDE SERPL-SCNC: 101 MMOL/L (ref 98–107)
CREAT SERPL-MCNC: 0.93 MG/DL (ref 0.51–0.95)
EGFRCR SERPLBLD CKD-EPI 2021: 70 ML/MIN/1.73M2
EOSINOPHIL # BLD AUTO: 0.1 10E3/UL (ref 0–0.7)
EOSINOPHIL NFR BLD AUTO: 2 %
ERYTHROCYTE [DISTWIDTH] IN BLOOD BY AUTOMATED COUNT: 12 % (ref 10–15)
GLUCOSE SERPL-MCNC: 148 MG/DL (ref 70–99)
HCO3 SERPL-SCNC: 29 MMOL/L (ref 22–29)
HCT VFR BLD AUTO: 35.1 % (ref 35–47)
HGB BLD-MCNC: 12 G/DL (ref 11.7–15.7)
IMM GRANULOCYTES # BLD: 0 10E3/UL
IMM GRANULOCYTES NFR BLD: 0 %
LYMPHOCYTES # BLD AUTO: 1.9 10E3/UL (ref 0.8–5.3)
LYMPHOCYTES NFR BLD AUTO: 42 %
MAGNESIUM SERPL-MCNC: 1.6 MG/DL (ref 1.7–2.3)
MCH RBC QN AUTO: 31.3 PG (ref 26.5–33)
MCHC RBC AUTO-ENTMCNC: 34.2 G/DL (ref 31.5–36.5)
MCV RBC AUTO: 91 FL (ref 78–100)
MONOCYTES # BLD AUTO: 0.4 10E3/UL (ref 0–1.3)
MONOCYTES NFR BLD AUTO: 9 %
NEUTROPHILS # BLD AUTO: 2.1 10E3/UL (ref 1.6–8.3)
NEUTROPHILS NFR BLD AUTO: 46 %
NRBC # BLD AUTO: 0 10E3/UL
NRBC BLD AUTO-RTO: 0 /100
PLATELET # BLD AUTO: 215 10E3/UL (ref 150–450)
POTASSIUM SERPL-SCNC: 4 MMOL/L (ref 3.4–5.3)
PROT SERPL-MCNC: 6.3 G/DL (ref 6.4–8.3)
RBC # BLD AUTO: 3.84 10E6/UL (ref 3.8–5.2)
SODIUM SERPL-SCNC: 138 MMOL/L (ref 135–145)
TROPONIN T SERPL HS-MCNC: 14 NG/L
TSH SERPL DL<=0.005 MIU/L-ACNC: 2.08 UIU/ML (ref 0.3–4.2)
WBC # BLD AUTO: 4.6 10E3/UL (ref 4–11)

## 2025-05-26 PROCEDURE — 84443 ASSAY THYROID STIM HORMONE: CPT | Performed by: EMERGENCY MEDICINE

## 2025-05-26 PROCEDURE — 70450 CT HEAD/BRAIN W/O DYE: CPT

## 2025-05-26 PROCEDURE — 99284 EMERGENCY DEPT VISIT MOD MDM: CPT | Performed by: EMERGENCY MEDICINE

## 2025-05-26 PROCEDURE — 250N000011 HC RX IP 250 OP 636: Performed by: EMERGENCY MEDICINE

## 2025-05-26 PROCEDURE — 36415 COLL VENOUS BLD VENIPUNCTURE: CPT | Performed by: EMERGENCY MEDICINE

## 2025-05-26 PROCEDURE — 85004 AUTOMATED DIFF WBC COUNT: CPT | Performed by: EMERGENCY MEDICINE

## 2025-05-26 PROCEDURE — 83735 ASSAY OF MAGNESIUM: CPT | Performed by: EMERGENCY MEDICINE

## 2025-05-26 PROCEDURE — 82040 ASSAY OF SERUM ALBUMIN: CPT | Performed by: EMERGENCY MEDICINE

## 2025-05-26 PROCEDURE — 84484 ASSAY OF TROPONIN QUANT: CPT | Performed by: EMERGENCY MEDICINE

## 2025-05-26 PROCEDURE — 99285 EMERGENCY DEPT VISIT HI MDM: CPT | Mod: 25

## 2025-05-26 PROCEDURE — 70498 CT ANGIOGRAPHY NECK: CPT

## 2025-05-26 PROCEDURE — 250N000009 HC RX 250: Performed by: EMERGENCY MEDICINE

## 2025-05-26 RX ORDER — IOPAMIDOL 755 MG/ML
67 INJECTION, SOLUTION INTRAVASCULAR ONCE
Status: COMPLETED | OUTPATIENT
Start: 2025-05-26 | End: 2025-05-26

## 2025-05-26 RX ADMIN — SODIUM CHLORIDE 100 ML: 9 INJECTION, SOLUTION INTRAVENOUS at 14:27

## 2025-05-26 RX ADMIN — IOPAMIDOL 67 ML: 755 INJECTION, SOLUTION INTRAVENOUS at 14:26

## 2025-05-26 ASSESSMENT — ACTIVITIES OF DAILY LIVING (ADL)
ADLS_ACUITY_SCORE: 41

## 2025-05-26 ASSESSMENT — ENCOUNTER SYMPTOMS
PSYCHIATRIC NEGATIVE: 1
RESPIRATORY NEGATIVE: 1
ALLERGIC/IMMUNOLOGIC NEGATIVE: 1
MUSCULOSKELETAL NEGATIVE: 1
CARDIOVASCULAR NEGATIVE: 1
CONSTITUTIONAL NEGATIVE: 1
ENDOCRINE NEGATIVE: 1
HEMATOLOGIC/LYMPHATIC NEGATIVE: 1
NEUROLOGICAL NEGATIVE: 1
GASTROINTESTINAL NEGATIVE: 1

## 2025-05-26 ASSESSMENT — COLUMBIA-SUICIDE SEVERITY RATING SCALE - C-SSRS
2. HAVE YOU ACTUALLY HAD ANY THOUGHTS OF KILLING YOURSELF IN THE PAST MONTH?: NO
1. IN THE PAST MONTH, HAVE YOU WISHED YOU WERE DEAD OR WISHED YOU COULD GO TO SLEEP AND NOT WAKE UP?: YES
6. HAVE YOU EVER DONE ANYTHING, STARTED TO DO ANYTHING, OR PREPARED TO DO ANYTHING TO END YOUR LIFE?: NO

## 2025-05-26 NOTE — ED TRIAGE NOTES
Pt presents to ED with concerns of visual changes; pt reports at night after opening her eyes sometimes her visual field appears like curtains; lesia concerned about detached retina. Pt has been at [facility] for 3 weeks due to ETOH.      Triage Assessment (Adult)       Row Name 05/26/25 1202          Triage Assessment    Airway WDL WDL        Cardiac WDL    Cardiac WDL WDL        Cognitive/Neuro/Behavioral WDL    Cognitive/Neuro/Behavioral WDL X  visual changes previously noted

## 2025-05-26 NOTE — ED PROVIDER NOTES
History     Chief Complaint   Patient presents with    Eye Problem     Pt presents to ED with concerns of visual changes; pt reports at night after opening her eyes sometimes her visual field appears like curtains; [facility] concerned about detached retina. Pt has been at [facility] for 3 weeks due to ETOH.      HPI  Nicolle Naidu is a 60 year old female who presents with concern about binocular visual disturbance since last night.  Patient describes sensation of looking through curtains.  She is currently in treatment at [facility] over the last 2 weeks for alcohol use disorder.  The care team was concerned about retinal detachment and advised to present to the emergency department for further care.    Reviewed the medical record.  Reviewed visit on 4/208/25-patient has a prior diagnosis of hypertension, insomnia, anxiety, and is known to have a history of alcohol use disorder.  Patient also has a history of obstructive sleep apnea and substance use disorder.  His type 2 diabetes and hypertension.  Her home prescribed medications were reviewed with the patient at bedside.    On examination patient reports she has had intermittent visual disturbance described as sensation of seeing floaters in both visual fields with her eyes closed.  She also reports that she has had a sense of losing details in her vision over the last week.  She reports the episode occurred more recently yesterday.  She also describes a sensation of tingling about her scalp.  She has had no loss of vision.  She reports her last eye exam was over a year ago.  She confirmed that she is from the [place] area and that she works as a .  She reports her last drink was about 20 days ago.  This is her second time seeking treatment and support.  She confirms that she is on furosemide 20 mg once a day, hydroxyzine as needed, paroxetine, gabapentin, bupropion, omeprazole and trazodone.  Symptoms do not seem to  change irrespective of which eye is closed.  She does not have a headache she also reports she has had no paresthesias or extremity weakness or numbness.  Given her visual changes her care team referred her to the emergency department for further care      Allergies:  Allergies   Allergen Reactions    Wasp Venom Protein Shortness Of Breath    Bee Venom Swelling     Fever       Problem List:    Patient Active Problem List    Diagnosis Date Noted    MDD (major depressive disorder) 04/28/2025     Priority: Medium    RUDY (generalized anxiety disorder) 04/28/2025     Priority: Medium    Substance use disorder 04/28/2025     Priority: Medium    Alcohol use disorder, moderate, dependence (H) 04/28/2025     Priority: Medium    Major depressive disorder, recurrent episode, moderate (H) 04/28/2025     Priority: Medium    Insomnia due to other mental disorder 04/28/2025     Priority: Medium    JUN (obstructive sleep apnea) 05/12/2016     Priority: Medium     Diagnostic Study  Sleep Study 5/10/2016 - (225.0 lbs) AHI 27.5, RDI 27.5, Supine AHI 68.6, REM AHI 40.6, Low O2 75.1%, Time Spent <=88% 20.4 minutes / Time Spent <=89% 25.8 minutes.      History of breast cancer 03/01/2016     Priority: Medium    Obesity (BMI 30-39.9) 03/01/2016     Priority: Medium    Snoring 03/01/2016     Priority: Medium    Diabetes type 2, controlled (H) 03/01/2016     Priority: Medium    Essential hypertension 03/01/2016     Priority: Medium    Pure hypercholesterolemia 03/01/2016     Priority: Medium    Bilateral low back pain without sciatica 03/01/2016     Priority: Medium    Gastroesophageal reflux disease with esophagitis 03/01/2016     Priority: Medium    Tobacco use disorder 03/01/2016     Priority: Medium        Past Medical History:    Past Medical History:   Diagnosis Date    Degenerative disorder of bone 2010       Past Surgical History:    Past Surgical History:   Procedure Laterality Date    BREAST SURGERY      LAPAROSCOPIC GASTRIC  "SLEEVE      MASTECTOMY      TONSILLECTOMY         Family History:    No family history on file.    Social History:  Marital Status:   [2]  Social History     Tobacco Use    Smoking status: Every Day     Current packs/day: 0.25     Average packs/day: 0.3 packs/day for 30.0 years (7.5 ttl pk-yrs)     Types: Cigarettes    Smokeless tobacco: Never   Substance Use Topics    Alcohol use: Yes     Comment: vodka every day 1/4 liter a day for past 1.5 years.    Drug use: Never        Medications:    buPROPion (WELLBUTRIN XL) 300 MG 24 hr tablet  calcium citrate (CITRACAL) 950 (200 Ca) MG tablet  cholecalciferol 50 MCG (2000 UT) tablet  furosemide (LASIX) 20 MG tablet  gabapentin (NEURONTIN) 100 MG capsule  hydrOXYzine HCl (ATARAX) 25 MG tablet  lisinopril (ZESTRIL) 10 MG tablet  naltrexone (DEPADE/REVIA) 50 MG tablet  OMEPRAZOLE PO  PARoxetine (PAXIL) 30 MG tablet  QUEtiapine (SEROQUEL) 25 MG tablet  traZODone (DESYREL) 100 MG tablet          Review of Systems   Constitutional: Negative.    HENT: Negative.     Eyes:  Positive for visual disturbance.   Respiratory: Negative.     Cardiovascular: Negative.    Gastrointestinal: Negative.    Endocrine: Negative.    Genitourinary: Negative.    Musculoskeletal: Negative.    Skin: Negative.    Allergic/Immunologic: Negative.    Neurological: Negative.    Hematological: Negative.    Psychiatric/Behavioral: Negative.     All other systems reviewed and are negative.      Physical Exam   BP: 128/84  Pulse: 72  Temp: 97.9  F (36.6  C)  Resp: 16  Height: 162.6 cm (5' 4\")  Weight: 83.9 kg (185 lb)  SpO2: 95 %      Physical Exam  Constitutional:       General: She is not in acute distress.     Appearance: Normal appearance. She is not ill-appearing, toxic-appearing or diaphoretic.   HENT:      Head: Normocephalic and atraumatic.      Nose: Nose normal.   Eyes:      Extraocular Movements: Extraocular movements intact.      Pupils: Pupils are equal, round, and reactive to light. " "  Cardiovascular:      Rate and Rhythm: Normal rate and regular rhythm.      Pulses: Normal pulses.      Heart sounds: Normal heart sounds.   Pulmonary:      Effort: Pulmonary effort is normal. No respiratory distress.      Breath sounds: Normal breath sounds. No stridor. No wheezing, rhonchi or rales.   Chest:      Chest wall: No tenderness.   Musculoskeletal:         General: No swelling, tenderness, deformity or signs of injury.      Cervical back: Normal range of motion and neck supple.      Right lower leg: No edema.      Left lower leg: No edema.   Skin:     Capillary Refill: Capillary refill takes less than 2 seconds.      Coloration: Skin is not jaundiced or pale.      Findings: No bruising, erythema, lesion or rash.   Neurological:      General: No focal deficit present.      Mental Status: She is alert and oriented to person, place, and time.      Cranial Nerves: No cranial nerve deficit.      Sensory: No sensory deficit.      Motor: No weakness.      Coordination: Coordination normal.      Gait: Gait normal.      Deep Tendon Reflexes: Reflexes normal.   Psychiatric:         Mood and Affect: Mood normal.         Behavior: Behavior normal.         Thought Content: Thought content normal.         Judgment: Judgment normal.         ED Course        Procedures              Critical Care time:  none     None         ED medications:      ED Vitals:  Vitals:    05/26/25 1208   BP: 128/84   Pulse: 72   Resp: 16   Temp: 97.9  F (36.6  C)   TempSrc: Oral   SpO2: 95%   Weight: 83.9 kg (185 lb)   Height: 1.626 m (5' 4\")      ED labs and imaging:  Results for orders placed or performed during the hospital encounter of 05/26/25   CT Head w/o Contrast     Status: None (Preliminary result)    Narrative    EXAM: CTA HEAD NECK W CONTRAST, CT HEAD W/O CONTRAST  LOCATION: [facility]  DATE: 5/26/2025    INDICATION: Hx of alcohol, substance and tobacco use disorder. Intermittent visual disturbance x " 1 week. Evaluate for acute vascular process.  COMPARISON: CT brain 02/05/2021. MRI brain 02/11/2018.  CONTRAST: 67 mL Isovue 370.  TECHNIQUE: Head and neck CT angiogram with IV contrast. Noncontrast head CT followed by axial helical CT images of the head and neck vessels obtained during the arterial phase of intravenous contrast administration. Axial 2D reconstructed images and   multiplanar 3D MIP reconstructed images of the head and neck vessels were performed by the technologist. Dose reduction techniques were used. All stenosis measurements made according to NASCET criteria unless otherwise specified.    FINDINGS:   NONCONTRAST HEAD CT:   INTRACRANIAL CONTENTS: No finding for intracranial hemorrhage, mass, or acute infarct. Ventricles are normal in size. Mild prominence of the sulci. Normal gray-white matter differentiation. No mass effect or midline shift.    Cerebellar tonsils are normally positioned. Sella is unremarkable for technique. Corpus callosum is normally formed.    VISUALIZED ORBITS/SINUSES/MASTOIDS: No intraorbital abnormality. No paranasal sinus mucosal disease. No middle ear or mastoid effusion.    BONES/SOFT TISSUES: Calvarium is intact, without suspicious lytic or blastic foci.    HEAD CTA:  ANTERIOR CIRCULATION: Minor atherosclerotic plaque right carotid siphon. Intracranial internal carotid arteries, anterior cerebral arteries, and middle cerebral arteries are otherwise normal in appearance. No vascular cutoff, aneurysm, or flow-limiting   stenosis.    POSTERIOR CIRCULATION: Intracranial vertebral arteries, basilar artery, and posterior cerebral arteries are patent. Left posterior communicating artery is patent. Right posterior communicating artery is not identified.    DURAL VENOUS SINUSES: Dural venous sinuses are not well evaluated on the basis of this exam.    NECK CTA:  RIGHT CAROTID: Coarse atherosclerotic calcifications right carotid bulb/bifurcation without significant associated  luminal narrowing/stenosis by NASCET criteria.    LEFT CAROTID: No measurable stenosis or dissection.    VERTEBRAL ARTERIES: Proximal right vertebral artery is obscured by venous contrast. Cervical vertebral arteries otherwise unremarkable with the right minimally larger than the left.    AORTIC ARCH: Mild atherosclerotic plaque. Visualized aortic arch and the proximal great vessels otherwise unremarkable.    NONVASCULAR STRUCTURES: Lung apices are clear. Visualized osseous structures are without suspicious lytic or blastic foci.      Impression    IMPRESSION:   HEAD CT:  1.  No finding for intracranial hemorrhage, mass, or acute infarct.    HEAD CTA:  1.  No vascular cutoff, aneurysm, or flow-limiting stenosis.    NECK CTA:  1.  No significant stenosis either cervical carotid or vertebral system. No findings for dissection.     CTA Head Neck with Contrast     Status: None (Preliminary result)    Narrative    EXAM: CTA HEAD NECK W CONTRAST, CT HEAD W/O CONTRAST  LOCATION: [facility]  DATE: 5/26/2025    INDICATION: Hx of alcohol, substance and tobacco use disorder. Intermittent visual disturbance x 1 week. Evaluate for acute vascular process.  COMPARISON: CT brain 02/05/2021. MRI brain 02/11/2018.  CONTRAST: 67 mL Isovue 370.  TECHNIQUE: Head and neck CT angiogram with IV contrast. Noncontrast head CT followed by axial helical CT images of the head and neck vessels obtained during the arterial phase of intravenous contrast administration. Axial 2D reconstructed images and   multiplanar 3D MIP reconstructed images of the head and neck vessels were performed by the technologist. Dose reduction techniques were used. All stenosis measurements made according to NASCET criteria unless otherwise specified.    FINDINGS:   NONCONTRAST HEAD CT:   INTRACRANIAL CONTENTS: No finding for intracranial hemorrhage, mass, or acute infarct. Ventricles are normal in size. Mild prominence of the sulci. Normal  gray-white matter differentiation. No mass effect or midline shift.    Cerebellar tonsils are normally positioned. Sella is unremarkable for technique. Corpus callosum is normally formed.    VISUALIZED ORBITS/SINUSES/MASTOIDS: No intraorbital abnormality. No paranasal sinus mucosal disease. No middle ear or mastoid effusion.    BONES/SOFT TISSUES: Calvarium is intact, without suspicious lytic or blastic foci.    HEAD CTA:  ANTERIOR CIRCULATION: Minor atherosclerotic plaque right carotid siphon. Intracranial internal carotid arteries, anterior cerebral arteries, and middle cerebral arteries are otherwise normal in appearance. No vascular cutoff, aneurysm, or flow-limiting   stenosis.    POSTERIOR CIRCULATION: Intracranial vertebral arteries, basilar artery, and posterior cerebral arteries are patent. Left posterior communicating artery is patent. Right posterior communicating artery is not identified.    DURAL VENOUS SINUSES: Dural venous sinuses are not well evaluated on the basis of this exam.    NECK CTA:  RIGHT CAROTID: Coarse atherosclerotic calcifications right carotid bulb/bifurcation without significant associated luminal narrowing/stenosis by NASCET criteria.    LEFT CAROTID: No measurable stenosis or dissection.    VERTEBRAL ARTERIES: Proximal right vertebral artery is obscured by venous contrast. Cervical vertebral arteries otherwise unremarkable with the right minimally larger than the left.    AORTIC ARCH: Mild atherosclerotic plaque. Visualized aortic arch and the proximal great vessels otherwise unremarkable.    NONVASCULAR STRUCTURES: Lung apices are clear. Visualized osseous structures are without suspicious lytic or blastic foci.      Impression    IMPRESSION:   HEAD CT:  1.  No finding for intracranial hemorrhage, mass, or acute infarct.    HEAD CTA:  1.  No vascular cutoff, aneurysm, or flow-limiting stenosis.    NECK CTA:  1.  No significant stenosis either cervical carotid or vertebral system.  No findings for dissection.     Comprehensive metabolic panel     Status: Abnormal   Result Value Ref Range    Sodium 138 135 - 145 mmol/L    Potassium 4.0 3.4 - 5.3 mmol/L    Carbon Dioxide (CO2) 29 22 - 29 mmol/L    Anion Gap 8 7 - 15 mmol/L    Urea Nitrogen 12.3 8.0 - 23.0 mg/dL    Creatinine 0.93 0.51 - 0.95 mg/dL    GFR Estimate 70 >60 mL/min/1.73m2    Calcium 9.7 8.8 - 10.4 mg/dL    Chloride 101 98 - 107 mmol/L    Glucose 148 (H) 70 - 99 mg/dL    Alkaline Phosphatase 67 40 - 150 U/L    AST 25 0 - 45 U/L    ALT 24 0 - 50 U/L    Protein Total 6.3 (L) 6.4 - 8.3 g/dL    Albumin 3.9 3.5 - 5.2 g/dL    Bilirubin Total 0.2 <=1.2 mg/dL   Magnesium     Status: Abnormal   Result Value Ref Range    Magnesium 1.6 (L) 1.7 - 2.3 mg/dL   TSH with free T4 reflex     Status: Normal   Result Value Ref Range    TSH 2.08 0.30 - 4.20 uIU/mL   Troponin T, High Sensitivity     Status: Normal   Result Value Ref Range    Troponin T, High Sensitivity 14 <=14 ng/L   CBC with platelets and differential     Status: None   Result Value Ref Range    WBC Count 4.6 4.0 - 11.0 10e3/uL    RBC Count 3.84 3.80 - 5.20 10e6/uL    Hemoglobin 12.0 11.7 - 15.7 g/dL    Hematocrit 35.1 35.0 - 47.0 %    MCV 91 78 - 100 fL    MCH 31.3 26.5 - 33.0 pg    MCHC 34.2 31.5 - 36.5 g/dL    RDW 12.0 10.0 - 15.0 %    Platelet Count 215 150 - 450 10e3/uL    % Neutrophils 46 %    % Lymphocytes 42 %    % Monocytes 9 %    % Eosinophils 2 %    % Basophils 1 %    % Immature Granulocytes 0 %    NRBCs per 100 WBC 0 <1 /100    Absolute Neutrophils 2.1 1.6 - 8.3 10e3/uL    Absolute Lymphocytes 1.9 0.8 - 5.3 10e3/uL    Absolute Monocytes 0.4 0.0 - 1.3 10e3/uL    Absolute Eosinophils 0.1 0.0 - 0.7 10e3/uL    Absolute Basophils 0.0 0.0 - 0.2 10e3/uL    Absolute Immature Granulocytes 0.0 <=0.4 10e3/uL    Absolute NRBCs 0.0 10e3/uL   CBC with platelets differential     Status: None    Narrative    The following orders were created for panel order CBC with platelets  differential.  Procedure                               Abnormality         Status                     ---------                               -----------         ------                     CBC with platelets and ...[[id number]]                      Final result                 Please view results for these tests on the individual orders.       Assessments & Plan (with Medical Decision Making)   Assessment Summary and clinical Impression: 60-year-old female who was referred from [facility] with concern about binocular visual disturbance described as a sensation of looking through curtains in the last 24 hours.  Patient has multiple medical diagnoses, history of type 2 diabetes and hypertension.  History of obstructive sleep apnea.  She has known substance use disorder, alcohol use disorder and tobacco use disorder.  On arrival she was comfortably afebrile and hemodynamically normal.  Pupils were equal reactive to light, normal extraocular muscle movements.  No proptosis.  Also reported no complaint symptoms specifically no headache, paresthesias or extremity weakness or numbness. Visual acuity during ED course was 20/50-right eye 20/40- left eye   Given binocular visual disturbance described neuroimaging was completed to ensure patient's symptoms are due to an acute cerebrovascular process given her comorbidities including history of tobacco use disorder, substance and alcohol use disorder.  On assessment she described no visual loss or eye pain or pressure. She was asymptomatic during her ED course of care. With symptoms reported to be binocular consistently and more pronounced with eyes closed neuroimaging was reassuring and blood work was unrevealing.  Given binocular symptoms and intermittent discomfort discussed next steps for care including follow-up with her eye care professional. She expressed understanding and comfort with plan of care.      ED course and plan:  Reviewed the medical record. With  acute binocular disturbance, comorbidities including tobacco use disorder, alcohol use disorder and substance use disorder neuroimages completed to ensure symptoms related to an acute cerebrovascular process including head CT, CTA head and neck as MRI was not available due to the holiday weekend.Blood work today revealed normal troponin, normal TSH, Magnesium-1.6. Glucose- 148. Normal liver enzymes. Normal hemogram.  Neuroimaging revealed no acute findings.  With intermittent symptoms and binocular distribution reviewed follow-up care we discussed the importance of follow-up with her eye care professional and that if there is a visual loss or eye pain or pressure or new concerns she should be brought back to be reexamined.    Patient expressed comfort and understanding of the plan of care.    Disclaimer: This note consists of symbols derived from keyboarding, dictation and/or voice recognition software. As a result, there may be errors in the script that have gone undetected. Please consider this when interpreting information found in this chart.   I have reviewed the nursing notes.    I have reviewed the findings, diagnosis, plan and need for follow up with the patient.           Medical Decision Making  The patient's presentation was of high complexity (advanced age, binocular visual disturbance, history of substance and alcohol use disorder tobacco use disorder.).    The patient's evaluation involved:  ordering and/or review of 3+ test(s) in this encounter (visual acuity, serial neurologic  evaluation, neuroimaging, blood work )    The patient's management necessitated high risk (Follow-up care with eye care professional ).        New Prescriptions    No medications on file       Final diagnoses:   Visual disturbance - Binocular and intermittent in the last week   History of alcohol use disorder   History of tobacco use disorder       5/26/2025   [facility] EMERGENCY DEPT       Karri Gorman  MD Danny  05/26/25 [id number]

## 2025-05-26 NOTE — DISCHARGE INSTRUCTIONS
1) Your evaluation today did not reveal emergent condition or diagnosis.  We reviewed limitations of your assessment today and the best next steps for care.  We discussed and reviewed the importance of follow-up with your eye care professional especially if your vision symptoms persist or continue.    2) We have discussed that if you develop new concerns specifically eye pain or pressure, loss of vision, or if there are new symptoms of concern that accompany your visual changes this would be concerning that you should return to be re-evaluated.

## 2025-06-04 NOTE — ED TRIAGE NOTES
Patient presents to the ED with concerns about her high blood pressure and has been drinking a lot and wants help with detox.      Triage Assessment (Adult)       Row Name 04/28/25 0618          Triage Assessment    Airway WDL WDL        Respiratory WDL    Respiratory WDL WDL        Skin Circulation/Temperature WDL    Skin Circulation/Temperature WDL WDL        Cardiac WDL    Cardiac WDL WDL        Cognitive/Neuro/Behavioral WDL    Cognitive/Neuro/Behavioral WDL WDL                     
related to dislike of food at facility PTA